# Patient Record
Sex: MALE | Race: WHITE | Employment: FULL TIME | URBAN - METROPOLITAN AREA
[De-identification: names, ages, dates, MRNs, and addresses within clinical notes are randomized per-mention and may not be internally consistent; named-entity substitution may affect disease eponyms.]

---

## 2017-08-12 ENCOUNTER — APPOINTMENT (OUTPATIENT)
Dept: GENERAL RADIOLOGY | Age: 37
End: 2017-08-12
Attending: EMERGENCY MEDICINE
Payer: COMMERCIAL

## 2017-08-12 ENCOUNTER — HOSPITAL ENCOUNTER (EMERGENCY)
Age: 37
Discharge: HOME | End: 2017-08-12
Attending: EMERGENCY MEDICINE
Payer: COMMERCIAL

## 2017-08-12 VITALS
HEIGHT: 69 IN | TEMPERATURE: 98.3 F | SYSTOLIC BLOOD PRESSURE: 137 MMHG | DIASTOLIC BLOOD PRESSURE: 62 MMHG | WEIGHT: 300 LBS | RESPIRATION RATE: 10 BRPM | OXYGEN SATURATION: 96 % | HEART RATE: 44 BPM | BODY MASS INDEX: 44.43 KG/M2

## 2017-08-12 DIAGNOSIS — R00.2 PALPITATIONS: Primary | ICD-10-CM

## 2017-08-12 DIAGNOSIS — R00.1 BRADYCARDIA: ICD-10-CM

## 2017-08-12 LAB
ANION GAP BLD CALC-SCNC: 4 MMOL/L (ref 3–18)
ATRIAL RATE: 58 BPM
BASOPHILS # BLD AUTO: 0 K/UL (ref 0–0.06)
BASOPHILS # BLD: 0 % (ref 0–2)
BUN SERPL-MCNC: 16 MG/DL (ref 7–18)
BUN/CREAT SERPL: 17 (ref 12–20)
CALCIUM SERPL-MCNC: 8.5 MG/DL (ref 8.5–10.1)
CALCULATED P AXIS, ECG09: 36 DEGREES
CALCULATED R AXIS, ECG10: 46 DEGREES
CALCULATED T AXIS, ECG11: 54 DEGREES
CHLORIDE SERPL-SCNC: 108 MMOL/L (ref 100–108)
CK MB CFR SERPL CALC: 1 % (ref 0–4)
CK MB SERPL-MCNC: 1.2 NG/ML (ref 5–25)
CK SERPL-CCNC: 124 U/L (ref 39–308)
CO2 SERPL-SCNC: 28 MMOL/L (ref 21–32)
CREAT SERPL-MCNC: 0.94 MG/DL (ref 0.6–1.3)
DIAGNOSIS, 93000: NORMAL
DIFFERENTIAL METHOD BLD: NORMAL
EOSINOPHIL # BLD: 0.4 K/UL (ref 0–0.4)
EOSINOPHIL NFR BLD: 5 % (ref 0–5)
ERYTHROCYTE [DISTWIDTH] IN BLOOD BY AUTOMATED COUNT: 13.8 % (ref 11.6–14.5)
GLUCOSE SERPL-MCNC: 99 MG/DL (ref 74–99)
HCT VFR BLD AUTO: 42.4 % (ref 36–48)
HGB BLD-MCNC: 14.7 G/DL (ref 13–16)
LYMPHOCYTES # BLD AUTO: 32 % (ref 21–52)
LYMPHOCYTES # BLD: 2.9 K/UL (ref 0.9–3.6)
MAGNESIUM SERPL-MCNC: 2.2 MG/DL (ref 1.6–2.6)
MCH RBC QN AUTO: 30.1 PG (ref 24–34)
MCHC RBC AUTO-ENTMCNC: 34.7 G/DL (ref 31–37)
MCV RBC AUTO: 86.7 FL (ref 74–97)
MONOCYTES # BLD: 0.7 K/UL (ref 0.05–1.2)
MONOCYTES NFR BLD AUTO: 8 % (ref 3–10)
NEUTS SEG # BLD: 4.9 K/UL (ref 1.8–8)
NEUTS SEG NFR BLD AUTO: 55 % (ref 40–73)
P-R INTERVAL, ECG05: 134 MS
PLATELET # BLD AUTO: 186 K/UL (ref 135–420)
PMV BLD AUTO: 10.9 FL (ref 9.2–11.8)
POTASSIUM SERPL-SCNC: 3.8 MMOL/L (ref 3.5–5.5)
Q-T INTERVAL, ECG07: 426 MS
QRS DURATION, ECG06: 104 MS
QTC CALCULATION (BEZET), ECG08: 418 MS
RBC # BLD AUTO: 4.89 M/UL (ref 4.7–5.5)
SODIUM SERPL-SCNC: 140 MMOL/L (ref 136–145)
TROPONIN I SERPL-MCNC: <0.02 NG/ML (ref 0–0.04)
VENTRICULAR RATE, ECG03: 58 BPM
WBC # BLD AUTO: 8.9 K/UL (ref 4.6–13.2)

## 2017-08-12 PROCEDURE — 71010 XR CHEST PORT: CPT

## 2017-08-12 PROCEDURE — 83735 ASSAY OF MAGNESIUM: CPT | Performed by: EMERGENCY MEDICINE

## 2017-08-12 PROCEDURE — 93005 ELECTROCARDIOGRAM TRACING: CPT

## 2017-08-12 PROCEDURE — 99284 EMERGENCY DEPT VISIT MOD MDM: CPT

## 2017-08-12 PROCEDURE — 82550 ASSAY OF CK (CPK): CPT | Performed by: EMERGENCY MEDICINE

## 2017-08-12 PROCEDURE — 85025 COMPLETE CBC W/AUTO DIFF WBC: CPT | Performed by: EMERGENCY MEDICINE

## 2017-08-12 PROCEDURE — 80048 BASIC METABOLIC PNL TOTAL CA: CPT | Performed by: EMERGENCY MEDICINE

## 2017-08-12 NOTE — LETTER
[facility] EMERGENCY DEPT 
[facility] [address] [id number] 
[phone] Work/School Note Date: 8/12/2017 To Whom It May concern: 
 
Darion López was seen and treated today in the emergency room by the following provider(s): 
Attending Provider: Russell Feliciano MD. Darion López may return to work on 08/13/2017. Sincerely, Amanda Gibbs RN

## 2017-08-12 NOTE — ED NOTES
Per tele monitor, Pt HR dropping to 42-45. At this time pt states he feels dizzy \"and then I panic and it goes back up again. \" BP stable at 135/98, pt is NOT diaphoretic and denies chest pain with these episodes. Dr. Ashli Whittaker made aware, tele strip printed and placed in chart for scanning.

## 2017-08-12 NOTE — ED NOTES
Pt bradycardia in the 40's with stable BP. States he still feels tired, but he has been working a lot. Denies chest pain, does not appear pale or diaphoretic. Dr. Stephens Half in to see the patient. Cardiac monitoring continued.

## 2017-08-12 NOTE — ED PROVIDER NOTES
[facility] EMERGENCY DEPT      42 y.o. male with noted past medical history of hypertension presents to the emergency department with complaints of an irregular heart beat. The patient states that he has had this problems for years, but today he felt dizzy and that his mouth was dry when he woke up. Patient an associated symptom of abdominal pain. Patient's symptoms are currently improved. Patient states that he does not have a primary care physician and has not had his symptoms checked out before. The patient states that he is a pack a day smoker. The patient denies alcohol and drug use. No other complaints. Nursing nurses regarding the HPI and triage nursing notes were reviewed. No current facility-administered medications for this encounter. Current Outpatient Prescriptions   Medication Sig    buprenorphine-naloxone 8-2 mg subl 2 Tabs by SubLINGual route daily. Indications: OPIOID WITHDRAWAL SYMPTOMS    propranolol LA (INDERAL LA) 60 mg SR capsule Take 60 mg by mouth daily. Indications: HYPERTENSION       Past Medical History:   Diagnosis Date    Hypertension        History reviewed. No pertinent surgical history. History reviewed. No pertinent family history. Social History     Social History    Marital status:      Spouse name: N/A    Number of children: N/A    Years of education: N/A     Occupational History    Not on file. Social History Main Topics    Smoking status: Current Every Day Smoker    Smokeless tobacco: Never Used    Alcohol use No    Drug use: No    Sexual activity: Not on file     Other Topics Concern    Not on file     Social History Narrative       No Known Allergies    Patient's primary care provider (as noted in EPIC):  None    REVIEW OF SYSTEMS:    Constitutional:  Negative for diaphoresis. Eyes:  Negative for diploplia. HENT:  Negative for congestion. Respiratory:  Negative for stridor.     Cardiovascular:  Negative for palpitations. Gastrointestinal:  Negative for diarrhea. Genitourinary:  Negative for flank pain. Musculoskeletal:  Negative for back pain. Skin:  Negative for pallor. Neurological:  Negative for weakness. Psychiatric:  Negative for hallucinations. Visit Vitals    /86    Pulse (!) 49    Temp 98.3 °F (36.8 °C)    Resp 10    Ht 5' 9\" (1.753 m)    Wt 136.1 kg (300 lb)    SpO2 95%    BMI 44.3 kg/m2       PHYSICAL EXAM:    CONSTITUTIONAL:  Alert, in no apparent distress;  well developed;  well nourished. HEAD:  Normocephalic, atraumatic. EYES:  EOMI. Non-icteric sclera. Normal conjunctiva. ENTM:  Nose:  no rhinorrhea. Throat:  no erythema or exudate, mucous membranes moist.  NECK:  No JVD. Supple  RESPIRATORY:  Chest clear, equal breath sounds, good air movement. CARDIOVASCULAR:  Regular rate and rhythm. No murmurs, rubs, or gallops. Chest:  No rash, lesions, bruising. No focal reproducible tenderness to palpation. GI:  Normal bowel sounds, abdomen soft and non-tender. No rebound or guarding. BACK:  Non-tender. UPPER EXT:  Normal inspection. LOWER EXT:  No edema, no calf tenderness. Distal pulses intact. NEURO:  Moves all four extremities, and grossly normal motor exam.  SKIN:  No rashes;  Normal for age. PSYCH:  Alert and normal affect. DIFFERENTIAL DIAGNOSES/ MEDICAL DECISION MAKING:  Palpitations from possible cardiac etiology, to include one of a multitude of underlying arrhythmias, presentations as part of an acute cardiac event including acute myocardial infarction/ acute coronary syndrome, mitral valve prolapse associated chest pain and symptoms, underlying sepsis, electrolyte imbalance, endocrine or thyroid axis disorder, dehydration, stress induced versus numerous other etiologies or a combination of the above.     Abnormal lab results from this emergency department encounter:  Labs Reviewed   CBC WITH AUTOMATED DIFF   METABOLIC PANEL, BASIC   MAGNESIUM CARDIAC PANEL,(CK, CKMB & TROPONIN)       Lab values for this patient within approximately the last 12 hours:  Recent Results (from the past 12 hour(s))   CBC WITH AUTOMATED DIFF    Collection Time: 08/12/17  3:45 AM   Result Value Ref Range    WBC 8.9 4.6 - 13.2 K/uL    RBC 4.89 4.70 - 5.50 M/uL    HGB 14.7 13.0 - 16.0 g/dL    HCT 42.4 36.0 - 48.0 %    MCV 86.7 74.0 - 97.0 FL    MCH 30.1 24.0 - 34.0 PG    MCHC 34.7 31.0 - 37.0 g/dL    RDW 13.8 11.6 - 14.5 %    PLATELET 861 362 - 768 K/uL    MPV 10.9 9.2 - 11.8 FL    NEUTROPHILS 55 40 - 73 %    LYMPHOCYTES 32 21 - 52 %    MONOCYTES 8 3 - 10 %    EOSINOPHILS 5 0 - 5 %    BASOPHILS 0 0 - 2 %    ABS. NEUTROPHILS 4.9 1.8 - 8.0 K/UL    ABS. LYMPHOCYTES 2.9 0.9 - 3.6 K/UL    ABS. MONOCYTES 0.7 0.05 - 1.2 K/UL    ABS. EOSINOPHILS 0.4 0.0 - 0.4 K/UL    ABS. BASOPHILS 0.0 0.0 - 0.06 K/UL    DF AUTOMATED     METABOLIC PANEL, BASIC    Collection Time: 08/12/17  3:45 AM   Result Value Ref Range    Sodium 140 136 - 145 mmol/L    Potassium 3.8 3.5 - 5.5 mmol/L    Chloride 108 100 - 108 mmol/L    CO2 28 21 - 32 mmol/L    Anion gap 4 3.0 - 18 mmol/L    Glucose 99 74 - 99 mg/dL    BUN 16 7.0 - 18 MG/DL    Creatinine 0.94 0.6 - 1.3 MG/DL    BUN/Creatinine ratio 17 12 - 20      GFR est AA >60 >60 ml/min/1.73m2    GFR est non-AA >60 >60 ml/min/1.73m2    Calcium 8.5 8.5 - 10.1 MG/DL   MAGNESIUM    Collection Time: 08/12/17  3:45 AM   Result Value Ref Range    Magnesium 2.2 1.6 - 2.6 mg/dL   CARDIAC PANEL,(CK, CKMB & TROPONIN)    Collection Time: 08/12/17  3:45 AM   Result Value Ref Range     39 - 308 U/L    CK - MB 1.2 <3.6 ng/ml    CK-MB Index 1.0 0.0 - 4.0 %    Troponin-I, Qt. <0.02 0.0 - 0.045 NG/ML       Radiologist and cardiologist interpretations if available at time of this note:  No results found. Portable (A-P view) CXR:  Preliminary review of x-rays by ED Physician.   Interpretation of chest X-ray shows, no infiltrates, no pneumothorax, no CHF, no effusion. Interpreted by ED Physician:  Cardiac Monitor Strip interpretation is Sinus Bradycardia about 60 bpm, No ST changes noted, NORMAL WIDTH QRS. 12 lead EKG interpreted by ED Physician is Sinus Bradycardia about 60 bpm, non-specific EKG. Medication(s) ordered for patient during this emergency visit encounter:  Medications - No data to display    ED COURSE:    4:30 AM  During ED course, patient has a few times noted by nursing staff of a transient bradycardia in the 40's. Of note, patient is on propanolol. I believe that this may be the cause of his bradycardia with associated palpitations (manifested as skipped beats) secondary to propanolol use. IMPRESSION AND MEDICAL DECISION MAKING:  Based upon the patient's presentation with noted HPI and PE, along with the work up done in the emergency department, I believe that the patient is having palpitations. DIAGNOSIS:  1. Palpitations    SPECIFIC PATIENT INSTRUCTIONS FROM THE PHYSICIAN WHO TREATED YOU IN THE ER TODAY:  1. Return if any concerns or worsening of condition(s)  2. Avoid caffeine products, including coffee, tea, chocolate or energy   drinks and tablets. 3. FOLLOW UP APPOINTMENT:  Your primary doctor in 1-2 days (Monday) for reevaluation and possible Holter monitor placement for outpatient evaluation of your palpitations. 4. Stop taking the propanolol for now until further instructions from your primary doctor. [facility] BROWN Romero M.D. Provider Attestation:  If a scribe was utilized in generation of this patient record, I personally performed the services described in the documentation, reviewed the documentation, as recorded by the scribe in my presence, and it accurately records the patient's history of presenting illness, review of systems, patient physical examination, and procedures performed by me as the attending physician. [facility] BROWN Romero M.D.   CYNDI Board Certified Emergency Physician  8/12/2017.  3:56 OLY Araya [address] [place] [address] acting as a scribe for and in the presence of Gael Red MD      August 12, 2017 at 5:06 AM       Provider Attestation:      I personally performed the services described in the documentation, reviewed the documentation, as recorded by the scribe in my presence, and it accurately and completely records my words and actions.  August 12, 2017 at 5:06 AM - Gael Red MD

## 2017-08-12 NOTE — DISCHARGE INSTRUCTIONS
SPECIFIC PATIENT INSTRUCTIONS FROM THE PHYSICIAN WHO TREATED YOU IN THE ER TODAY:  1. Return if any concerns or worsening of condition(s)  2. Avoid caffeine products, including coffee, tea, chocolate or energy   drinks and tablets. 3. FOLLOW UP APPOINTMENT:  Your primary doctor in 1-2 days (Monday) for reevaluation and possible Holter monitor placement for outpatient evaluation of your palpitations. 4. Stop taking the propanolol for now until further instructions from your primary doctor. Bradycardia: Care Instructions  Your Care Instructions    Bradycardia is a slow heart rate. If your heart beats too slowly, it can't supply your body with enough blood. This can make you weak or dizzy. Or it may make you pass out. Sometimes medicine can cause this problem. If this happens, your doctor may have you adjust one of your medicines. If a medicine is not the problem, your doctor may recommend a pacemaker. It is important to treat bradycardia so that you don't get more serious health problems. Your doctor will want to see you on a routine schedule to make sure that your heartbeat is normal.  Follow-up care is a key part of your treatment and safety. Be sure to make and go to all appointments, and call your doctor if you are having problems. It's also a good idea to know your test results and keep a list of the medicines you take. How can you care for yourself at home? · Take your medicines exactly as prescribed. Call your doctor if you think you are having a problem with your medicine. If your bradycardia is caused by another disease, your doctor will try to treat the disease. If it is caused by heart medicines, he or she will adjust your medicines. · Make lifestyle changes to improve your heart health. ¨ To control your cholesterol, avoid foods with a lot of fat, saturated fat, or sodium. Try to eat more fiber. And if your doctor says it's okay, get some exercise on most days. ¨ Do not smoke.  Smoking can make your heart condition worse. If you need help quitting, talk to your doctor about stop-smoking programs and medicines. These can increase your chances of quitting for good. ¨ Limit alcohol to 2 drinks a day for men and 1 drink a day for women. Too much alcohol can cause health problems. Pacemaker  If you have a pacemaker, you will get more specific information about it. Be sure to:  · Check your pulse as your doctor tells you. · Have your pacemaker checked as often as your doctor recommends. You may be able to do this over the phone or computer. · Avoid strong magnetic or electrical fields. These include wand metal detectors used in airports, MRIs, welding equipment, and generators. · You will be checked several times right after you get your pacemaker and when it is time to have the battery changed. Batteries last for 5 to 15 years. · You can talk on a cell phone. But keep it 6 inches away from your pacemaker. · Microwaves, TVs, radios, and kitchen and bathroom appliances won't harm you. When should you call for help? Call 911 anytime you think you may need emergency care. For example, call if:  · You passed out (lost consciousness). · You have symptoms of a heart attack. These may include:  ¨ Chest pain or pressure, or a strange feeling in the chest.  ¨ Sweating. ¨ Shortness of breath. ¨ Nausea or vomiting. ¨ Pain, pressure, or a strange feeling in the back, neck, jaw, or upper belly or in one or both shoulders or arms. ¨ Lightheadedness or sudden weakness. ¨ A fast or irregular heartbeat. After you call 911, the  may tell you to chew 1 adult-strength or 2 to 4 low-dose aspirin. Wait for an ambulance. Do not try to drive yourself. · You have symptoms of a stroke. These may include:  ¨ Sudden numbness, tingling, weakness, or loss of movement in your face, arm, or leg, especially on only one side of your body. ¨ Sudden vision changes. ¨ Sudden trouble speaking.   ¨ Sudden confusion or trouble understanding simple statements. ¨ Sudden problems with walking or balance. ¨ A sudden, severe headache that is different from past headaches. Call your doctor now or seek immediate medical care if:  · You are dizzy or lightheaded, or you feel like you may faint. · You have new or increased shortness of breath. · You had a pacemaker implanted and you have signs of infection, such as:  ¨ Increased pain, swelling, warmth, or redness. ¨ Red streaks leading from the cut (incision). ¨ Pus draining from the incision. ¨ A fever. Watch closely for changes in your health, and be sure to contact your doctor if you have any problems. Where can you learn more? Go to http://jaleel-tadeo.info/. Enter M068 in the search box to learn more about \"Bradycardia: Care Instructions. \"  Current as of: April 3, 2017  Content Version: 11.3  © 9818-1846 [employer]. Care instructions adapted under license by [employer] (which disclaims liability or warranty for this information). If you have questions about a medical condition or this instruction, always ask your healthcare professional. [place] [address] any warranty or liability for your use of this information. Palpitations: Care Instructions  Your Care Instructions    Heart palpitations are the uncomfortable sensation that your heart is beating fast or irregularly. You might feel pounding or fluttering in your chest. It might feel like your heart is skipping a beat. Although palpitations may be caused by a heart problem, they also occur because of stress, fatigue, or use of alcohol, caffeine, or nicotine. Many medicines, including diet pills, antihistamines, decongestants, and some herbal products, can cause heart palpitations. Nearly everyone has palpitations from time to time.   Depending on your symptoms, your doctor may need to do more tests to try to find the cause of your palpitations. Follow-up care is a key part of your treatment and safety. Be sure to make and go to all appointments, and call your doctor if you are having problems. It's also a good idea to know your test results and keep a list of the medicines you take. How can you care for yourself at home? · Avoid caffeine, nicotine, and excess alcohol. · Do not take illegal drugs, such as methamphetamines and cocaine. · Do not take weight loss or diet medicines unless you talk with your doctor first.  · Get plenty of sleep. · Do not overeat. · If you have palpitations again, take deep breaths and try to relax. This may slow a racing heart. · If you start to feel lightheaded, lie down to avoid injuries that might result if you pass out and fall down. · Keep a record of your palpitations and bring it to your next doctor's appointment. Write down:  ¨ The date and time. ¨ Your pulse. (If your heart is beating fast, it may be hard to count your pulse.)  ¨ What you were doing when the palpitations started. ¨ How long the palpitations lasted. ¨ Any other symptoms. · If an activity causes palpitations, slow down or stop. Talk to your doctor before you do that activity again. · Take your medicines exactly as prescribed. Call your doctor if you think you are having a problem with your medicine. When should you call for help? Call 911 anytime you think you may need emergency care. For example, call if:  · You passed out (lost consciousness). · You have symptoms of a heart attack. These may include:  ¨ Chest pain or pressure, or a strange feeling in the chest.  ¨ Sweating. ¨ Shortness of breath. ¨ Pain, pressure, or a strange feeling in the back, neck, jaw, or upper belly or in one or both shoulders or arms. ¨ Lightheadedness or sudden weakness. ¨ A fast or irregular heartbeat. After you call 911, the  may tell you to chew 1 adult-strength or 2 to 4 low-dose aspirin. Wait for an ambulance.  Do not try to drive yourself. · You have symptoms of a stroke. These may include:  ¨ Sudden numbness, tingling, weakness, or loss of movement in your face, arm, or leg, especially on only one side of your body. ¨ Sudden vision changes. ¨ Sudden trouble speaking. ¨ Sudden confusion or trouble understanding simple statements. ¨ Sudden problems with walking or balance. ¨ A sudden, severe headache that is different from past headaches. Call your doctor now or seek immediate medical care if:  · You have heart palpitations and:  ¨ Are dizzy or lightheaded, or you feel like you may faint. ¨ Have new or increased shortness of breath. Watch closely for changes in your health, and be sure to contact your doctor if:  · You continue to have heart palpitations. Where can you learn more? Go to http://jaleel-tadeo.info/. Enter R508 in the search box to learn more about \"Palpitations: Care Instructions. \"  Current as of: April 3, 2017  Content Version: 11.3  © 2776-3728 [employer]. Care instructions adapted under license by [employer] (which disclaims liability or warranty for this information). If you have questions about a medical condition or this instruction, always ask your healthcare professional. [address] any warranty or liability for your use of this information. [employer] Activation    Thank you for requesting access to [employer]. Please follow the instructions below to securely access and download your online medical record. [employer] allows you to send messages to your doctor, view your test results, renew your prescriptions, schedule appointments, and more. How Do I Sign Up? 1. In your internet browser, go to [email or web address]. 2. Click on the First Time User? Click Here link in the Sign In box. You will see the New Member Sign Up page. 3. Enter your [employer] Access Code exactly as it appears below.  You will not need to use this code after youve completed the sign-up process. If you do not sign up before the expiration date, you must request a new code. [employer] Access Code: [email or web address]  Expires: 11/10/2017  5:08 AM (This is the date your [employer] access code will )    4. Enter the last four digits of your Social Security Number (xxxx) and Date of Birth (mm/dd/yyyy) as indicated and click Submit. You will be taken to the next sign-up page. 5. Create a YouEarnedItt ID. This will be your [employer] login ID and cannot be changed, so think of one that is secure and easy to remember. 6. Create a [employer] password. You can change your password at any time. 7. Enter your Password Reset Question and Answer. This can be used at a later time if you forget your password. 8. Enter your e-mail address. You will receive e-mail notification when new information is available in [address] 9. Click Sign Up. You can now view and download portions of your medical record. 10. Click the Download Summary menu link to download a portable copy of your medical information. Additional Information    If you have questions, please visit the Frequently Asked Questions section of the [employer] website at [email or web address] Remember, [employer] is NOT to be used for urgent needs. For medical emergencies, dial 911.

## 2018-04-03 ENCOUNTER — HOSPITAL ENCOUNTER (EMERGENCY)
Age: 38
Discharge: HOME | End: 2018-04-04
Attending: EMERGENCY MEDICINE
Payer: COMMERCIAL

## 2018-04-03 DIAGNOSIS — R00.2 PALPITATIONS: Primary | ICD-10-CM

## 2018-04-03 PROCEDURE — 99285 EMERGENCY DEPT VISIT HI MDM: CPT

## 2018-04-03 PROCEDURE — 93005 ELECTROCARDIOGRAM TRACING: CPT

## 2018-04-04 VITALS
HEART RATE: 66 BPM | TEMPERATURE: 98.1 F | OXYGEN SATURATION: 95 % | DIASTOLIC BLOOD PRESSURE: 71 MMHG | RESPIRATION RATE: 12 BRPM | SYSTOLIC BLOOD PRESSURE: 131 MMHG

## 2018-04-04 LAB
ANION GAP BLD CALC-SCNC: 13 MMOL/L (ref 10–20)
BUN BLD-MCNC: 14 MG/DL (ref 7–18)
CA-I BLD-MCNC: 1.19 MMOL/L (ref 1.12–1.32)
CHLORIDE BLD-SCNC: 102 MMOL/L (ref 100–108)
CO2 BLD-SCNC: 30 MMOL/L (ref 19–24)
CREAT UR-MCNC: 0.9 MG/DL (ref 0.6–1.3)
GLUCOSE BLD STRIP.AUTO-MCNC: 99 MG/DL (ref 74–106)
HCT VFR BLD CALC: 46 % (ref 36–49)
HGB BLD-MCNC: 15.6 G/DL (ref 12–16)
POTASSIUM BLD-SCNC: 4 MMOL/L (ref 3.5–5.5)
SODIUM BLD-SCNC: 141 MMOL/L (ref 136–145)
TROPONIN I BLD-MCNC: <0.04 NG/ML (ref 0–0.08)

## 2018-04-04 PROCEDURE — 80047 BASIC METABLC PNL IONIZED CA: CPT

## 2018-04-04 PROCEDURE — 84484 ASSAY OF TROPONIN QUANT: CPT

## 2018-04-04 NOTE — ED PROVIDER NOTES
HPI Comments: Darion López is a 34 y.o. Male with h/o htn with c/o sensation of heart skipping beats earlier this evening without cp, sob, syncope, near syncope. Sx now  Resolved. Is on propanolol for htn which he has been taking. No sob, fcs, nvd. Increased stress recently, dec sleep. Unknown trigger and nothing taken. The history is provided by the patient. Past Medical History:   Diagnosis Date    Hypertension        No past surgical history on file. No family history on file. Social History     Social History    Marital status:      Spouse name: N/A    Number of children: N/A    Years of education: N/A     Occupational History    Not on file. Social History Main Topics    Smoking status: Current Every Day Smoker    Smokeless tobacco: Never Used    Alcohol use No    Drug use: No    Sexual activity: Not on file     Other Topics Concern    Not on file     Social History Narrative         ALLERGIES: Review of patient's allergies indicates no known allergies. Review of Systems   Constitutional: Negative for fever. HENT: Negative for sore throat. Eyes: Negative for visual disturbance. Respiratory: Negative for shortness of breath. Cardiovascular: Positive for palpitations. Negative for chest pain. Gastrointestinal: Negative for abdominal pain. Endocrine: Negative for polyuria. Musculoskeletal: Negative for gait problem. Skin: Negative for rash. Allergic/Immunologic: Negative for immunocompromised state. Neurological: Negative for syncope. Psychiatric/Behavioral: Positive for sleep disturbance. The patient is nervous/anxious. Vitals:    04/03/18 2321 04/04/18 0108 04/04/18 0134   BP: (!) 161/105  148/90   Pulse: 73  64   Resp: 18  12   Temp: 98.1 °F (36.7 °C)     SpO2: 99% 98% 97%            Physical Exam   Constitutional: He is oriented to person, place, and time. Non-toxic appearance. He does not appear ill. No distress.    HENT:   Head: Normocephalic and atraumatic. Right Ear: External ear normal.   Left Ear: External ear normal.   Nose: Nose normal.   Mouth/Throat: Oropharynx is clear and moist. No oropharyngeal exudate. Eyes: Conjunctivae are normal.   Neck: Normal range of motion. Cardiovascular: Normal rate, regular rhythm, normal heart sounds and intact distal pulses. Pulmonary/Chest: Effort normal and breath sounds normal. No respiratory distress. Abdominal: Soft. There is no tenderness. Musculoskeletal: Normal range of motion. He exhibits no edema. Neurological: He is alert and oriented to person, place, and time. Skin: Skin is warm and dry. He is not diaphoretic. Psychiatric: His behavior is normal.   Nursing note and vitals reviewed.        [facility]      ED Course       Procedures  Vitals:  Patient Vitals for the past 12 hrs:   Temp Pulse Resp BP SpO2   04/04/18 0134 - 64 12 148/90 97 %   04/04/18 0108 - - - - 98 %   04/03/18 2321 98.1 °F (36.7 °C) 73 18 (!) 161/105 99 %         Medications ordered:   Medications - No data to display      Lab findings:  Recent Results (from the past 12 hour(s))   EKG, 12 LEAD, INITIAL    Collection Time: 04/03/18 11:27 PM   Result Value Ref Range    Ventricular Rate 69 BPM    Atrial Rate 69 BPM    P-R Interval 152 ms    QRS Duration 100 ms    Q-T Interval 404 ms    QTC Calculation (Bezet) 432 ms    Calculated P Axis 30 degrees    Calculated R Axis 34 degrees    Calculated T Axis 38 degrees    Diagnosis       Normal sinus rhythm  Normal ECG  When compared with ECG of 12-AUG-2017 03:34,  No significant change was found     POC TROPONIN-I    Collection Time: 04/04/18  1:41 AM   Result Value Ref Range    Troponin-I (POC) <0.04 0.00 - 0.08 ng/mL   POC CHEM8    Collection Time: 04/04/18  1:42 AM   Result Value Ref Range    CO2, POC 30 (H) 19 - 24 MMOL/L    Glucose, POC 99 74 - 106 MG/DL    BUN, POC 14 7 - 18 MG/DL    Creatinine, POC 0.9 0.6 - 1.3 MG/DL    GFRAA, POC >60 >60 ml/min/1.73m2    GFRNA, POC >60 >60 ml/min/1.73m2    Sodium,  136 - 145 MMOL/L    Potassium, POC 4.0 3.5 - 5.5 MMOL/L    Calcium, ionized (POC) 1.19 1.12 - 1.32 mmol/L    Chloride,  100 - 108 MMOL/L    Anion gap, POC 13 10 - 20      Hematocrit, POC 46 36 - 49 %    Hemoglobin, POC 15.6 12 - 16 G/DL       EKG interpretation by ED Physician:  nsr with no acute st tw changes  Rate 69, pr 152, atc 432  No sig changes    Cardiac monitor: nl rat reg rhythm. No ectopy  Pulse ox\" 97% room air    X-Ray, CT or other radiology findings or impressions:  No orders to display       Progress notes, Consult notes or additional Procedure notes:   Doubt need for other work up. Pt already on bb. Likely due to stress, dec sleep  I have discussed with patient and/or family/sig other the results, interpretation of any imaging if performed, suspected diagnosis and treatment plan to include instructions regarding the diagnoses listed to which understanding was expressed with all questions answered      Reevaluation of patient:   stable    Disposition:  Diagnosis:   1. Palpitations        Disposition: home    Follow-up Information     Follow up With Details Comments Contact Info    Rowena Montero MD Schedule an appointment as soon as possible for a visit If symptoms worsen [facility] ([facility])  [facility]  [place] [address]  [phone]              Patient's Medications   Start Taking    No medications on file   Continue Taking    BUPRENORPHINE-NALOXONE 8-2 MG SUBL    2 Tabs by SubLINGual route daily. Indications: OPIOID WITHDRAWAL SYMPTOMS    PROPRANOLOL LA (INDERAL LA) 60 MG SR CAPSULE    Take 60 mg by mouth daily.  Indications: HYPERTENSION   These Medications have changed    No medications on file   Stop Taking    No medications on file

## 2018-04-05 LAB
ATRIAL RATE: 69 BPM
CALCULATED P AXIS, ECG09: 30 DEGREES
CALCULATED R AXIS, ECG10: 34 DEGREES
CALCULATED T AXIS, ECG11: 38 DEGREES
DIAGNOSIS, 93000: NORMAL
P-R INTERVAL, ECG05: 152 MS
Q-T INTERVAL, ECG07: 404 MS
QRS DURATION, ECG06: 100 MS
QTC CALCULATION (BEZET), ECG08: 432 MS
VENTRICULAR RATE, ECG03: 69 BPM

## 2018-07-02 ENCOUNTER — APPOINTMENT (OUTPATIENT)
Dept: GENERAL RADIOLOGY | Age: 38
DRG: 309 | End: 2018-07-02
Attending: NURSE PRACTITIONER
Payer: SELF-PAY

## 2018-07-02 ENCOUNTER — HOSPITAL ENCOUNTER (INPATIENT)
Age: 38
LOS: 3 days | Discharge: HOME | DRG: 309 | End: 2018-07-05
Attending: EMERGENCY MEDICINE | Admitting: FAMILY MEDICINE
Payer: SELF-PAY

## 2018-07-02 DIAGNOSIS — R00.1 BRADYCARDIA: Primary | ICD-10-CM

## 2018-07-02 LAB
ALBUMIN SERPL-MCNC: 3.5 G/DL (ref 3.4–5)
ALBUMIN/GLOB SERPL: 1 {RATIO} (ref 0.8–1.7)
ALP SERPL-CCNC: 115 U/L (ref 45–117)
ALT SERPL-CCNC: 22 U/L (ref 16–61)
ANION GAP SERPL CALC-SCNC: 7 MMOL/L (ref 3–18)
AST SERPL-CCNC: 13 U/L (ref 15–37)
ATRIAL RATE: 43 BPM
ATRIAL RATE: 62 BPM
BASOPHILS # BLD: 0 K/UL (ref 0–0.06)
BASOPHILS NFR BLD: 1 % (ref 0–2)
BILIRUB SERPL-MCNC: 0.5 MG/DL (ref 0.2–1)
BUN SERPL-MCNC: 14 MG/DL (ref 7–18)
BUN/CREAT SERPL: 15 (ref 12–20)
CALCIUM SERPL-MCNC: 8.8 MG/DL (ref 8.5–10.1)
CALCULATED P AXIS, ECG09: 31 DEGREES
CALCULATED P AXIS, ECG09: 33 DEGREES
CALCULATED R AXIS, ECG10: 14 DEGREES
CALCULATED R AXIS, ECG10: 39 DEGREES
CALCULATED T AXIS, ECG11: 38 DEGREES
CALCULATED T AXIS, ECG11: 38 DEGREES
CHLORIDE SERPL-SCNC: 105 MMOL/L (ref 100–108)
CK MB CFR SERPL CALC: NORMAL % (ref 0–4)
CK MB SERPL-MCNC: <1 NG/ML (ref 5–25)
CK SERPL-CCNC: 96 U/L (ref 39–308)
CO2 SERPL-SCNC: 28 MMOL/L (ref 21–32)
CREAT SERPL-MCNC: 0.95 MG/DL (ref 0.6–1.3)
DIAGNOSIS, 93000: NORMAL
DIAGNOSIS, 93000: NORMAL
DIFFERENTIAL METHOD BLD: NORMAL
EOSINOPHIL # BLD: 0.2 K/UL (ref 0–0.4)
EOSINOPHIL NFR BLD: 2 % (ref 0–5)
ERYTHROCYTE [DISTWIDTH] IN BLOOD BY AUTOMATED COUNT: 13.3 % (ref 11.6–14.5)
GLOBULIN SER CALC-MCNC: 3.4 G/DL (ref 2–4)
GLUCOSE SERPL-MCNC: 123 MG/DL (ref 74–99)
HCT VFR BLD AUTO: 44.8 % (ref 36–48)
HGB BLD-MCNC: 15.4 G/DL (ref 13–16)
LYMPHOCYTES # BLD: 2 K/UL (ref 0.9–3.6)
LYMPHOCYTES NFR BLD: 23 % (ref 21–52)
MAGNESIUM SERPL-MCNC: 2.1 MG/DL (ref 1.6–2.6)
MCH RBC QN AUTO: 30.1 PG (ref 24–34)
MCHC RBC AUTO-ENTMCNC: 34.4 G/DL (ref 31–37)
MCV RBC AUTO: 87.5 FL (ref 74–97)
MONOCYTES # BLD: 0.6 K/UL (ref 0.05–1.2)
MONOCYTES NFR BLD: 7 % (ref 3–10)
NEUTS SEG # BLD: 5.8 K/UL (ref 1.8–8)
NEUTS SEG NFR BLD: 67 % (ref 40–73)
P-R INTERVAL, ECG05: 136 MS
P-R INTERVAL, ECG05: 148 MS
PLATELET # BLD AUTO: 195 K/UL (ref 135–420)
PMV BLD AUTO: 11.4 FL (ref 9.2–11.8)
POTASSIUM SERPL-SCNC: 4 MMOL/L (ref 3.5–5.5)
PROT SERPL-MCNC: 6.9 G/DL (ref 6.4–8.2)
Q-T INTERVAL, ECG07: 408 MS
Q-T INTERVAL, ECG07: 446 MS
QRS DURATION, ECG06: 100 MS
QRS DURATION, ECG06: 108 MS
QTC CALCULATION (BEZET), ECG08: 376 MS
QTC CALCULATION (BEZET), ECG08: 414 MS
RBC # BLD AUTO: 5.12 M/UL (ref 4.7–5.5)
SODIUM SERPL-SCNC: 140 MMOL/L (ref 136–145)
TROPONIN I SERPL-MCNC: <0.02 NG/ML (ref 0–0.04)
TROPONIN I SERPL-MCNC: <0.02 NG/ML (ref 0–0.04)
TSH SERPL DL<=0.05 MIU/L-ACNC: 0.92 UIU/ML (ref 0.36–3.74)
VENTRICULAR RATE, ECG03: 43 BPM
VENTRICULAR RATE, ECG03: 62 BPM
WBC # BLD AUTO: 8.6 K/UL (ref 4.6–13.2)

## 2018-07-02 PROCEDURE — 84443 ASSAY THYROID STIM HORMONE: CPT | Performed by: INTERNAL MEDICINE

## 2018-07-02 PROCEDURE — 93005 ELECTROCARDIOGRAM TRACING: CPT

## 2018-07-02 PROCEDURE — 74011250636 HC RX REV CODE- 250/636: Performed by: NURSE PRACTITIONER

## 2018-07-02 PROCEDURE — 80053 COMPREHEN METABOLIC PANEL: CPT | Performed by: EMERGENCY MEDICINE

## 2018-07-02 PROCEDURE — 77030021352 HC CBL LD SYS DISP COVD -B

## 2018-07-02 PROCEDURE — 85025 COMPLETE CBC W/AUTO DIFF WBC: CPT | Performed by: EMERGENCY MEDICINE

## 2018-07-02 PROCEDURE — 65660000000 HC RM CCU STEPDOWN

## 2018-07-02 PROCEDURE — 83735 ASSAY OF MAGNESIUM: CPT | Performed by: EMERGENCY MEDICINE

## 2018-07-02 PROCEDURE — 77030032491 HC SLV COMPR SCD KNE XL COVD -B

## 2018-07-02 PROCEDURE — 99284 EMERGENCY DEPT VISIT MOD MDM: CPT

## 2018-07-02 PROCEDURE — 82550 ASSAY OF CK (CPK): CPT | Performed by: EMERGENCY MEDICINE

## 2018-07-02 PROCEDURE — 71045 X-RAY EXAM CHEST 1 VIEW: CPT

## 2018-07-02 PROCEDURE — 36415 COLL VENOUS BLD VENIPUNCTURE: CPT | Performed by: NURSE PRACTITIONER

## 2018-07-02 PROCEDURE — 74011250637 HC RX REV CODE- 250/637: Performed by: EMERGENCY MEDICINE

## 2018-07-02 PROCEDURE — 96360 HYDRATION IV INFUSION INIT: CPT

## 2018-07-02 PROCEDURE — 74011250636 HC RX REV CODE- 250/636: Performed by: EMERGENCY MEDICINE

## 2018-07-02 RX ORDER — ACETAMINOPHEN 325 MG/1
650 TABLET ORAL
Status: DISCONTINUED | OUTPATIENT
Start: 2018-07-02 | End: 2018-07-05 | Stop reason: HOSPADM

## 2018-07-02 RX ORDER — HEPARIN SODIUM 5000 [USP'U]/ML
5000 INJECTION, SOLUTION INTRAVENOUS; SUBCUTANEOUS EVERY 8 HOURS
Status: DISCONTINUED | OUTPATIENT
Start: 2018-07-02 | End: 2018-07-05 | Stop reason: HOSPADM

## 2018-07-02 RX ORDER — HYDROCHLOROTHIAZIDE 25 MG/1
25 TABLET ORAL DAILY
Status: DISCONTINUED | OUTPATIENT
Start: 2018-07-03 | End: 2018-07-05 | Stop reason: HOSPADM

## 2018-07-02 RX ORDER — SODIUM CHLORIDE 9 MG/ML
50 INJECTION, SOLUTION INTRAVENOUS CONTINUOUS
Status: DISCONTINUED | OUTPATIENT
Start: 2018-07-02 | End: 2018-07-05 | Stop reason: HOSPADM

## 2018-07-02 RX ORDER — GUAIFENESIN 100 MG/5ML
324 LIQUID (ML) ORAL
Status: COMPLETED | OUTPATIENT
Start: 2018-07-02 | End: 2018-07-02

## 2018-07-02 RX ADMIN — ASPIRIN 81 MG 324 MG: 81 TABLET ORAL at 12:50

## 2018-07-02 RX ADMIN — HEPARIN SODIUM 5000 UNITS: 5000 INJECTION, SOLUTION INTRAVENOUS; SUBCUTANEOUS at 16:16

## 2018-07-02 RX ADMIN — SODIUM CHLORIDE 50 ML/HR: 900 INJECTION, SOLUTION INTRAVENOUS at 16:06

## 2018-07-02 RX ADMIN — SODIUM CHLORIDE 1000 ML: 900 INJECTION, SOLUTION INTRAVENOUS at 12:33

## 2018-07-02 NOTE — ROUTINE PROCESS
1545 - patient to [phone] from ER via stretcher - daughter at bedside and wife is on her way -  Patient is alert and oriented, denies any pain, does c/o lightheadedness with standing and when closing his eyes - denies any CP or SOB. IV fluids started.   Placed on tele and confirmed SB - admission assessment completed     1630 - wife at bedside and updated

## 2018-07-02 NOTE — CONSULTS
[facility] - Consult Note    Date of  Admission: 7/2/2018 11:57 AM   Primary Care Physician:  None      I saw, evaluated, interviewed and examined the patient personally. I agree with the findings and plan of care as documented below with ROSHNI note  Came to ED with some lightneadedness  Bradycardia noted on tele monitor. Possible occasional junctional rhythm but on EKG it is sinus bradycardia. Denies any presyncope or syncope  On High dose propranolol for HTN and anxiety  Electrolytes within normal limits  TSH added. - Discontinue beta blocker ( propranolol ) and avoid any AV elian blocking agent  - Use Chlorthalidone or HCTZ 25 mg daily for HTN  - ECHO can be done however can be done as outpatient if plan is to discharge. If echo unremarkable, no further cardiac work up will be needed. Mayank Sanchez MD           Assessment:     - Symptomatic david and possible junctional rhythm. Previously was taking beta blocker, Propranolol 160 mg daily.  - Anxiety   - Obesity  - Hypertension     Plan:     - Stop Propranolol, would find alternative antihypertensive and do not resume AV elian blocking agents. - If admitted, check echocardiogram  - TSH has been added onto labwork     History of Present Illness: This is a 34 y.o. male admitted for Bradycardia. Patient complains of:  Lightheaded    This is a 34year old male with a history of hypertension and anxiety that cardiology is seeing in consult due to bradycardia. He explains since Thursday, he started feeling like \"oxygen was leaving my head. \" He also complaints of intermittent palpitations, saying that he sometimes feels his heart is irregular, sometimes it skips and races. He has not had any chest pain or syncope. Has not had orthopnea or PND. He takes Propranolol 160 mg daily, his last dose was last night. He also has been out of one of his anxiety medications. Denies history of CAD or CHF.       Cardiac risk factors: smoking/ tobacco exposure, obesity, male gender, hypertension      Review of Symptoms:  Except as stated above include:  Constitutional:  negative  Respiratory:  negative  Cardiovascular:  Per HPI  Gastrointestinal: negative  Genitourinary:  negative  Musculoskeletal:  Negative  Neurological:  Negative  Dermatological:  Negative  Endocrinological: Negative  Psychological:  Negative    A comprehensive review of systems was negative except for that written in the HPI. Past Medical History:     Past Medical History:   Diagnosis Date    Hypertension          Social History:     Social History     Social History    Marital status:      Spouse name: N/A    Number of children: N/A    Years of education: N/A     Social History Main Topics    Smoking status: Current Every Day Smoker    Smokeless tobacco: Never Used    Alcohol use No    Drug use: No    Sexual activity: Not on file     Other Topics Concern    Not on file     Social History Narrative        Family History:   No family history on file. Medications:   No Known Allergies     No current facility-administered medications for this encounter. Current Outpatient Prescriptions   Medication Sig    buprenorphine-naloxone 8-2 mg subl 2 Tabs by SubLINGual route daily. Indications: OPIOID WITHDRAWAL SYMPTOMS    propranolol LA (INDERAL LA) 60 mg SR capsule Take 60 mg by mouth daily.  Indications: HYPERTENSION         Physical Exam:     Visit Vitals    /88 (BP 1 Location: Right arm, BP Patient Position: At rest)    Pulse (!) 51    Temp 98.3 °F (36.8 °C)    Resp 16    Ht 5' 9\" (1.753 m)    Wt 300 lb (136.1 kg)    SpO2 99%    BMI 44.3 kg/m2     BP Readings from Last 3 Encounters:   07/02/18 179/88   04/04/18 131/71   08/12/17 137/62     Pulse Readings from Last 3 Encounters:   07/02/18 (!) 51   04/04/18 66   08/12/17 (!) 44     Wt Readings from Last 3 Encounters:   07/02/18 300 lb (136.1 kg)   08/12/17 300 lb (136.1 kg)   10/12/16 250 lb (113.4 kg) General:  alert, cooperative, no distress  Neck:  nontender, no JVD  Lungs:  clear to auscultation bilaterally  Heart:  regular rate and rhythm, S1, S2 normal, no murmur, click, rub or gallop  Abdomen:  abdomen is soft without significant tenderness, masses, organomegaly or guarding  Extremities:  extremities normal, atraumatic, no cyanosis or edema  Skin: Warm and dry. no hyperpigmentation, vitiligo, or suspicious lesions  Neuro: alert, oriented x3, affect appropriate  Psych: non focal     Data Review:     Recent Labs      07/02/18   1222   WBC  8.6   HGB  15.4   HCT  44.8   PLT  195     Recent Labs      07/02/18   1222   NA  140   K  4.0   CL  105   CO2  28   GLU  123*   BUN  14   CREA  0.95   CA  8.8   MG  2.1   ALB  3.5   SGOT  13*   ALT  22       Results for orders placed or performed during the hospital encounter of 04/03/18   EKG, 12 LEAD, INITIAL   Result Value Ref Range    Ventricular Rate 69 BPM    Atrial Rate 69 BPM    P-R Interval 152 ms    QRS Duration 100 ms    Q-T Interval 404 ms    QTC Calculation (Bezet) 432 ms    Calculated P Axis 30 degrees    Calculated R Axis 34 degrees    Calculated T Axis 38 degrees    Diagnosis       Normal sinus rhythm  Normal ECG  When compared with ECG of 12-AUG-2017 03:34,  No significant change was found  Confirmed by Jailyn Marcano ([id number]) on 4/5/2018 11:31:37 AM         All Cardiac Markers in the last 24 hours:    Lab Results   Component Value Date/Time    CPK 96 07/02/2018 12:22 PM    CKMB <1.0 07/02/2018 12:22 PM    CKND1  07/02/2018 12:22 PM     CALCULATION NOT PERFORMED WHEN RESULT IS BELOW LINEAR LIMIT    Jose Founds <0.02 07/02/2018 12:22 PM       Last Lipid:  No results found for: CHOL, CHOLX, CHLST, CHOLV, HDL, LDL, LDLC, DLDLP, TGLX, TRIGL, TRIGP, CHHD, [facility]    Signed By: Natalia Gao.  Tamar Ramirez     July 2, 2018

## 2018-07-02 NOTE — ED NOTES
TRANSFER - ED to INPATIENT REPORT:    SBAR report made available to receiving floor on this patient being transferred to [address] [place] [address] (TELE)  for routine progression of care       Admitting diagnosis Bradycardia    Information from the following report(s) SBAR, ED Summary, Recent Results and Cardiac Rhythm sinus david was made available to receiving floor. Lines:   Peripheral IV 07/02/18 Left Antecubital (Active)   Site Assessment Clean, dry, & intact 7/2/2018 12:30 PM   Phlebitis Assessment 0 7/2/2018 12:30 PM   Infiltration Assessment 0 7/2/2018 12:30 PM   Action Taken Blood drawn 7/2/2018 12:30 PM        Medication list confirmed with patient    Opportunity for questions and clarification was provided.       Patient is oriented to time, place, person and situation n/a  Patient is  continent and ambulatory without assist     Valuables transported with patient     Patient transported with:   Monitor  Tech

## 2018-07-02 NOTE — IP AVS SNAPSHOT
[address] [address] 
[phone] Patient: Esequiel Tomlin MRN: [id number] A check arlyn indicates which time of day the medication should be taken. My Medications START taking these medications Instructions Each Dose to Equal  
 Morning Noon Evening Bedtime  
 hydroCHLOROthiazide 25 mg tablet Commonly known as:  HYDRODIURIL Start taking on:  7/6/2018 Your last dose was: Your next dose is: Take 1 Tab by mouth daily for 30 days. 25 mg CONTINUE taking these medications Instructions Each Dose to Equal  
 Morning Noon Evening Bedtime  
 buprenorphine-naloxone 8-2 mg Subl Your last dose was: Your next dose is:    
   
   
 2 Tabs by SubLINGual route daily. Indications: OPIOID WITHDRAWAL SYMPTOMS  
 2 Tab  
    
   
   
   
  
  
STOP taking these medications   
 doxepin 10 mg capsule Commonly known as:  SINEquan INDERAL LA 60 mg SR capsule Generic drug:  propranolol LA Where to Get Your Medications Information on where to get these meds will be given to you by the nurse or doctor. ! Ask your nurse or doctor about these medications  
  hydroCHLOROthiazide 25 mg tablet

## 2018-07-02 NOTE — IP AVS SNAPSHOT
[address] [address] 
[phone] Patient: Ovi Chavez MRN: [id number] GDD:[id number] About your hospitalization You were admitted on:  July 2, 2018 You last received care in the:  [address] [place] [address] Road You were discharged on:  July 5, 2018 Why you were hospitalized Your primary diagnosis was:  Not on File Your diagnoses also included:  Bradycardia, Htn (Hypertension), Anxiety Follow-up Information Follow up With Details Comments Contact Info Cassandra Flores MD On 7/6/2018 7:30am [address] [address] 
[phone] Your Scheduled Appointments Friday July 06, 2018  7:30 AM EDT New Patient with Cassandra Flores MD  
[facility]) [address] [address]  
[phone] Discharge Orders None A check arlyn indicates which time of day the medication should be taken. My Medications START taking these medications Instructions Each Dose to Equal  
 Morning Noon Evening Bedtime  
 hydroCHLOROthiazide 25 mg tablet Commonly known as:  HYDRODIURIL Start taking on:  7/6/2018 Your last dose was: Your next dose is: Take 1 Tab by mouth daily for 30 days. 25 mg CONTINUE taking these medications Instructions Each Dose to Equal  
 Morning Noon Evening Bedtime  
 buprenorphine-naloxone 8-2 mg Subl Your last dose was: Your next dose is:    
   
   
 2 Tabs by SubLINGual route daily. Indications: OPIOID WITHDRAWAL SYMPTOMS  
 2 Tab  
    
   
   
   
  
  
STOP taking these medications   
 doxepin 10 mg capsule Commonly known as:  SINEquan INDERAL LA 60 mg SR capsule Generic drug:  propranolol LA Where to Get Your Medications Information on where to get these meds will be given to you by the nurse or doctor. ! Ask your nurse or doctor about these medications  
  hydroCHLOROthiazide 25 mg tablet Opioid Education Prescription Opioids: What You Need to Know: 
 
Prescription opioids can be used to help relieve moderate-to-severe pain and are often prescribed following a surgery or injury, or for certain health conditions. These medications can be an important part of treatment but also come with serious risks. Opioids are strong pain medicines. Examples include hydrocodone, oxycodone, fentanyl, and morphine. Heroin is an example of an illegal opioid. It is important to work with your health care provider to make sure you are getting the safest, most effective care. WHAT ARE THE RISKS AND SIDE EFFECTS OF OPIOID USE? Prescription opioids carry serious risks of addiction and overdose, especially with prolonged use. An opioid overdose, often marked by slow breathing, can cause sudden death. The use of prescription opioids can have a number of side effects as well, even when taken as directed. · Tolerance-meaning you might need to take more of a medication for the same pain relief · Physical dependence-meaning you have symptoms of withdrawal when the medication is stopped. Withdrawal symptoms can include nausea, sweating, chills, diarrhea, stomach cramps, and muscle aches. Withdrawal can last up to several weeks, depending on which drug you took and how long you took it. · Increased sensitivity to pain · Constipation · Nausea, vomiting, and dry mouth · Sleepiness and dizziness · Confusion · Depression · Low levels of testosterone that can result in lower sex drive, energy, and strength · Itching and sweating RISKS ARE GREATER WITH:      
· History of drug misuse, substance use disorder, or overdose · Mental health conditions (such as depression or anxiety) · Sleep apnea · Older age (72 years or older) · Pregnancy Avoid alcohol while taking prescription opioids. Also, unless specifically advised by your health care provider, medications to avoid include: · Benzodiazepines (such as Xanax or Valium) · Muscle relaxants (such as Soma or Flexeril) · Hypnotics (such as Ambien or Lunesta) · Other prescription opioids KNOW YOUR OPTIONS Talk to your health care provider about ways to manage your pain that don't involve prescription opioids. Some of these options may actually work better and have fewer risks and side effects. Options may include: 
· Pain relievers such as acetaminophen, ibuprofen, and naproxen · Some medications that are also used for depression or seizures · Physical therapy and exercise · Counseling to help patients learn how to cope better with triggers of pain and stress. · Application of heat or cold compress · Massage therapy · Relaxation techniques Be Informed Make sure you know the name of your medication, how much and how often to take it, and its potential risks & side effects. IF YOU ARE PRESCRIBED OPIOIDS FOR PAIN: 
· Never take opioids in greater amounts or more often than prescribed. Remember the goal is not to be pain-free but to manage your pain at a tolerable level. · Follow up with your primary care provider to: · Work together to create a plan on how to manage your pain. · Talk about ways to help manage your pain that don't involve prescription opioids. · Talk about any and all concerns and side effects. · Help prevent misuse and abuse. · Never sell or share prescription opioids · Help prevent misuse and abuse. · Store prescription opioids in a secure place and out of reach of others (this may include visitors, children, friends, and family).  
· Safely dispose of unused/unwanted prescription opioids: Find your community drug take-back program or your pharmacy mail-back program, or flush them down the toilet, following guidance from the Food and Drug Administration (www.fda.gov/Drugs/ResourcesForYou). · Visit www.cdc.gov/drugoverdose to learn about the risks of opioid abuse and overdose. · If you believe you may be struggling with addiction, tell your health care provider and ask for guidance or call Anay Recio at 9-695-345-FULU. Discharge Instructions Bradycardia: Care Instructions Your Care Instructions Bradycardia is a slow heart rate. If your heart beats too slowly, it can't supply your body with enough blood. This can make you weak or dizzy. Or it may make you pass out. Sometimes medicine can cause this problem. If this happens, your doctor may have you adjust one of your medicines. If a medicine is not the problem, your doctor may recommend a pacemaker. It is important to treat bradycardia so that you don't get more serious health problems. Your doctor will want to see you on a routine schedule to make sure that your heartbeat is normal. 
Follow-up care is a key part of your treatment and safety. Be sure to make and go to all appointments, and call your doctor if you are having problems. It's also a good idea to know your test results and keep a list of the medicines you take. How can you care for yourself at home? · Take your medicines exactly as prescribed. Call your doctor if you think you are having a problem with your medicine. If your bradycardia is caused by another disease, your doctor will try to treat the disease. If it is caused by heart medicines, he or she will adjust your medicines. · Make lifestyle changes to improve your heart health. ¨ Get regular exercise. Try for 30 minutes on most days of the week. If you do not have other heart problems, you likely do not have limits on the type or level of activity that you can do.  You may want to walk, swim, bike, or do other activities. Ask your doctor what level of exercise is safe for you. ¨ To control your cholesterol, avoid foods with a lot of fat, saturated fat, or sodium. Try to eat more fiber. And if your doctor says it's okay, get some exercise on most days. ¨ Do not smoke. Smoking can make your heart condition worse. If you need help quitting, talk to your doctor about stop-smoking programs and medicines. These can increase your chances of quitting for good. ¨ Limit alcohol to 2 drinks a day for men and 1 drink a day for women. Too much alcohol can cause health problems. Pacemaker If you have a pacemaker, you will get more specific information about it. Be sure to: · Check your pulse as your doctor tells you. · Have your pacemaker checked as often as your doctor recommends. You may be able to do this over the phone or computer. · Avoid strong magnetic or electrical fields. These include wand metal detectors used in airports, MRIs, welding equipment, and generators. · You will be checked several times right after you get your pacemaker and when it is time to have the battery changed. Batteries last for 5 to 15 years. · You can talk on a cell phone. But keep it 6 inches away from your pacemaker. · Microwaves, TVs, radios, and kitchen and bathroom appliances won't harm you. When should you call for help? Call 911 anytime you think you may need emergency care. For example, call if: 
? · You have symptoms of sudden heart failure. These may include: ¨ Severe trouble breathing. ¨ A fast or irregular heartbeat. ¨ Coughing up pink, foamy mucus. ¨ You passed out. ? · You have symptoms of a stroke. These may include: 
¨ Sudden numbness, tingling, weakness, or loss of movement in your face, arm, or leg, especially on only one side of your body. ¨ Sudden vision changes. ¨ Sudden trouble speaking. ¨ Sudden confusion or trouble understanding simple statements. ¨ Sudden problems with walking or balance. ¨ A sudden, severe headache that is different from past headaches. ?Call your doctor now or seek immediate medical care if: 
? · You have new or changed symptoms of heart failure, such as: ¨ New or increased shortness of breath. ¨ New or worse swelling in your legs, ankles, or feet. ¨ Sudden weight gain, such as more than 2 to 3 pounds in a day or 5 pounds in a week. (Your doctor may suggest a different range of weight gain.) ¨ Feeling dizzy or lightheaded or like you may faint. ¨ Feeling so tired or weak that you cannot do your usual activities. ¨ Not sleeping well. Shortness of breath wakes you at night. You need extra pillows to prop yourself up to breathe easier. ? Watch closely for changes in your health, and be sure to contact your doctor if: 
? · You do not get better as expected. Where can you learn more? Go to http://jaleel-tadeo.info/. Enter K993 in the search box to learn more about \"Bradycardia: Care Instructions. \" Current as of: September 21, 2016 Content Version: 11.4 © 9815-0264 [employer]boxx. Care instructions adapted under license by [employer]renchguys mobile (which disclaims liability or warranty for this information). If you have questions about a medical condition or this instruction, always ask your healthcare professional. [place] [address] any warranty or liability for your use of this information. Bradycardia: Care Instructions Your Care Instructions Bradycardia is a slow heart rate. If your heart beats too slowly, it can't supply your body with enough blood. This can make you weak or dizzy. Or it may make you pass out. Sometimes medicine can cause this problem. If this happens, your doctor may have you adjust one of your medicines. If a medicine is not the problem, your doctor may recommend a pacemaker.  
It is important to treat bradycardia so that you don't get more serious health problems. Your doctor will want to see you on a routine schedule to make sure that your heartbeat is normal. 
Follow-up care is a key part of your treatment and safety. Be sure to make and go to all appointments, and call your doctor if you are having problems. It's also a good idea to know your test results and keep a list of the medicines you take. How can you care for yourself at home? · Take your medicines exactly as prescribed. Call your doctor if you think you are having a problem with your medicine. If your bradycardia is caused by another disease, your doctor will try to treat the disease. If it is caused by heart medicines, he or she will adjust your medicines. · Make lifestyle changes to improve your heart health. ¨ Get regular exercise. Try for 30 minutes on most days of the week. If you do not have other heart problems, you likely do not have limits on the type or level of activity that you can do. You may want to walk, swim, bike, or do other activities. Ask your doctor what level of exercise is safe for you. ¨ To control your cholesterol, avoid foods with a lot of fat, saturated fat, or sodium. Try to eat more fiber. And if your doctor says it's okay, get some exercise on most days. ¨ Do not smoke. Smoking can make your heart condition worse. If you need help quitting, talk to your doctor about stop-smoking programs and medicines. These can increase your chances of quitting for good. ¨ Limit alcohol to 2 drinks a day for men and 1 drink a day for women. Too much alcohol can cause health problems. Pacemaker If you have a pacemaker, you will get more specific information about it. Be sure to: · Check your pulse as your doctor tells you. · Have your pacemaker checked as often as your doctor recommends. You may be able to do this over the phone or computer. · Avoid strong magnetic or electrical fields.  These include wand metal detectors used in airports, MRIs, welding equipment, and generators. · You will be checked several times right after you get your pacemaker and when it is time to have the battery changed. Batteries last for 5 to 15 years. · You can talk on a cell phone. But keep it 6 inches away from your pacemaker. · Microwaves, TVs, radios, and kitchen and bathroom appliances won't harm you. When should you call for help? Call 911 anytime you think you may need emergency care. For example, call if: 
? · You have symptoms of sudden heart failure. These may include: ¨ Severe trouble breathing. ¨ A fast or irregular heartbeat. ¨ Coughing up pink, foamy mucus. ¨ You passed out. ? · You have symptoms of a stroke. These may include: 
¨ Sudden numbness, tingling, weakness, or loss of movement in your face, arm, or leg, especially on only one side of your body. ¨ Sudden vision changes. ¨ Sudden trouble speaking. ¨ Sudden confusion or trouble understanding simple statements. ¨ Sudden problems with walking or balance. ¨ A sudden, severe headache that is different from past headaches. ?Call your doctor now or seek immediate medical care if: 
? · You have new or changed symptoms of heart failure, such as: ¨ New or increased shortness of breath. ¨ New or worse swelling in your legs, ankles, or feet. ¨ Sudden weight gain, such as more than 2 to 3 pounds in a day or 5 pounds in a week. (Your doctor may suggest a different range of weight gain.) ¨ Feeling dizzy or lightheaded or like you may faint. ¨ Feeling so tired or weak that you cannot do your usual activities. ¨ Not sleeping well. Shortness of breath wakes you at night. You need extra pillows to prop yourself up to breathe easier. ? Watch closely for changes in your health, and be sure to contact your doctor if: 
? · You do not get better as expected. Where can you learn more? Go to http://jaleel-tadeo.info/. Enter G207 in the search box to learn more about \"Bradycardia: Care Instructions. \" Current as of: September 21, 2016 Content Version: 11.4 © 7708-7947 Solairedirect. Care instructions adapted under license by [employer] (which disclaims liability or warranty for this information). If you have questions about a medical condition or this instruction, always ask your healthcare professional. [address] any warranty or liability for your use of this information. [facility] Announcement We are excited to announce that we are making your provider's discharge notes available to you in [facility]. You will see these notes when they are completed and signed by the physician that discharged you from your recent hospital stay. If you have any questions or concerns about any information you see in [facility], please call the Health Information Department where you were seen or reach out to your Primary Care Provider for more information about your plan of care. Introducing [facility]! [employer] introduces [facility] patient portal. Now you can access parts of your medical record, email your doctor's office, and request medication refills online. 1. In your internet browser, go to [email or web address] 2. Click on the First Time User? Click Here link in the Sign In box. You will see the New Member Sign Up page. 3. Enter your [facility] Access Code exactly as it appears below. You will not need to use this code after youve completed the sign-up process. If you do not sign up before the expiration date, you must request a new code. · [facility] Access Code: [email or web address] Expires: 10/3/2018  2:02 PM 
 
4. Enter the last four digits of your Social Security Number (xxxx) and Date of Birth (mm/dd/yyyy) as indicated and click Submit. You will be taken to the next sign-up page. 5. Create a [employer] ID. This will be your [employer] login ID and cannot be changed, so think of one that is secure and easy to remember. 6. Create a [employer] password. You can change your password at any time. 7. Enter your Password Reset Question and Answer. This can be used at a later time if you forget your password. 8. Enter your e-mail address. You will receive e-mail notification when new information is available in [address] 9. Click Sign Up. You can now view and download portions of your medical record. 10. Click the Download Summary menu link to download a portable copy of your medical information. If you have questions, please visit the Frequently Asked Questions section of the [employer] website. Remember, [employer] is NOT to be used for urgent needs. For medical emergencies, dial 911. Now available from your iPhone and Android! Introducing Markell Cunningham As a [facility] patient, I wanted to make you aware of our electronic visit tool called Markell Jakenick. [facility] 24/7 allows you to connect within minutes with a medical provider 24 hours a day, seven days a week via a mobile device or tablet or logging into a secure website from your computer. You can access Markell Cunningham from anywhere in the [place]. A virtual visit might be right for you when you have a simple condition and feel like you just dont want to get out of bed, or cant get away from work for an appointment, when your regular [facility] provider is not available (evenings, weekends or holidays), or when youre out of town and need minor care. Electronic visits cost only $49 and if the [facility] 24/7 provider determines a prescription is needed to treat your condition, one can be electronically transmitted to a nearby pharmacy*. Please take a moment to enroll today if you have not already done so. The enrollment process is free and takes just a few minutes.   To enroll, please download the Francoise Faddavid 24/7 carlitos to your tablet or phone, or visit [email or web address] to enroll on your computer. And, as an [address] [place] [address] patient with a [employer] account, the results of your visits will be scanned into your electronic medical record and your primary care provider will be able to view the scanned results. We urge you to continue to see your regular Francoise Tegan provider for your ongoing medical care. And while your primary care provider may not be the one available when you seek a [facility] virtual visit, the peace of mind you get from getting a real diagnosis real time can be priceless. For more information on [facility], view our Frequently Asked Questions (FAQs) at [email or web address]. Sincerely, 
 
Jeff Toussaint MD 
Chief Medical Officer Kilo Sharmaine Cabrales *:  certain medications cannot be prescribed via [facility] Providers Seen During Your Hospitalization Provider Specialty Primary office phone Penny Dunlap MD Emergency Medicine [phone] Josh Blanc MD Family Practice [phone] Your Primary Care Physician (PCP) Primary Care Physician Office Phone Office Fax Dominik Grigsby [phone] You are allergic to the following No active allergies Recent Documentation Height Weight BMI Smoking Status 1.753 m 137.2 kg 44.67 kg/m2 Current Every Day Smoker Emergency Contacts Name Discharge Info Relation Home Work Mobile [id number] [facility] CAREGIVER [3] Spouse [3] [phone] Patient Belongings The following personal items are in your possession at time of discharge: 
  Dental Appliances: None  Visual Aid: None      Home Medications: None   Jewelry: None  Clothing: Pants, Shirt, Socks, Footwear    Other Valuables: Cell Phone (wallet and keys sent home with family ) Please provide this summary of care documentation to your next provider. Signatures-by signing, you are acknowledging that this After Visit Summary has been reviewed with you and you have received a copy. Patient Signature:  ____________________________________________________________ Date:  ____________________________________________________________  
  
[facility] Provider Signature:  ____________________________________________________________ Date:  ____________________________________________________________

## 2018-07-02 NOTE — PROGRESS NOTES
completed the initial Spiritual Assessment of the patient and family member in  Bed 6 of the emergency room and offered Pastoral Care support. . Patient does not have any Yarsanism/cultural needs that will affect patients preferences in health care. Chaplains will continue to follow and will provide pastoral care on an as needed/requested basis.     Isidro Fuentes  [facility] Care Department  [phone]

## 2018-07-02 NOTE — ED PROVIDER NOTES
EMERGENCY DEPARTMENT HISTORY AND PHYSICAL EXAM    12:00 PM      Date: 7/2/2018  Patient Name: Lesa Fleischer    History of Presenting Illness     Chief Complaint   Patient presents with    Dizziness    Irregular Heart Beat    Shortness of Breath         History Provided By: Patient    Chief Complaint: Palpations   Duration:  2 days  Timing:  Intermittent  Location: N/A  Quality: it feels like my head is not getting enough oxygen\"  Severity: N/A  Modifying Factors: N/A  Associated Symptoms: Dizziness, light-headedness, and diaphoresis. Denies syncope an chest pain. Additional History (Context): Lesa Fleischer is a 34 y.o. male who presents with intermittent palpations that began 2 days ago. The patient has associated symptoms symptoms of dizziness, light-headedness, and diaphoresis. He describes his symptoms as \"it feels like my head is not getting enough oxygen. \" The patient denies syncope and chest pain. He states he has had similar symptoms in the past. The patient states he recently ran out of his medications. The patient smokes tobacco.    PCP: None    Current Outpatient Prescriptions   Medication Sig Dispense Refill    buprenorphine-naloxone 8-2 mg subl 2 Tabs by SubLINGual route daily. Indications: OPIOID WITHDRAWAL SYMPTOMS      propranolol LA (INDERAL LA) 60 mg SR capsule Take 60 mg by mouth daily. Indications: HYPERTENSION         Past History     Past Medical History:  Past Medical History:   Diagnosis Date    Hypertension        Past Surgical History:  No past surgical history on file. Family History:  No family history on file. Social History:  Social History   Substance Use Topics    Smoking status: Current Every Day Smoker    Smokeless tobacco: Never Used    Alcohol use No       Allergies:  No Known Allergies      Review of Systems       Review of Systems   Constitutional: Positive for chills and diaphoresis. Cardiovascular: Positive for palpitations.  Negative for chest pain.   Neurological: Positive for dizziness and light-headedness. Negative for syncope. All other systems reviewed and are negative. Physical Exam     Visit Vitals    /88 (BP 1 Location: Right arm, BP Patient Position: At rest)    Pulse (!) 51    Temp 98.3 °F (36.8 °C)    Resp 16    Ht 5' 9\" (1.753 m)    Wt 136.1 kg (300 lb)    SpO2 99%    BMI 44.3 kg/m2         Physical Exam   Constitutional: He is oriented to person, place, and time. He appears well-developed and well-nourished. No distress. HENT:   Head: Normocephalic and atraumatic. Mouth/Throat: Oropharynx is clear and moist.   Eyes: Conjunctivae and EOM are normal. Pupils are equal, round, and reactive to light. No scleral icterus. Left eye cataract. Neck: Normal range of motion. Neck supple. Cardiovascular: Normal rate, regular rhythm and normal heart sounds. No murmur heard. Pulmonary/Chest: Effort normal and breath sounds normal. No respiratory distress. Abdominal: Soft. Bowel sounds are normal. He exhibits no distension. There is no tenderness. Musculoskeletal: He exhibits no edema. Lymphadenopathy:     He has no cervical adenopathy. Neurological: He is alert and oriented to person, place, and time. Coordination normal.   Skin: Skin is warm and dry. No rash noted. Psychiatric: He has a normal mood and affect. His behavior is normal.   Nursing note and vitals reviewed.         Diagnostic Study Results     Labs -  Recent Results (from the past 12 hour(s))   CBC WITH AUTOMATED DIFF    Collection Time: 07/02/18 12:22 PM   Result Value Ref Range    WBC 8.6 4.6 - 13.2 K/uL    RBC 5.12 4.70 - 5.50 M/uL    HGB 15.4 13.0 - 16.0 g/dL    HCT 44.8 36.0 - 48.0 %    MCV 87.5 74.0 - 97.0 FL    MCH 30.1 24.0 - 34.0 PG    MCHC 34.4 31.0 - 37.0 g/dL    RDW 13.3 11.6 - 14.5 %    PLATELET 030 098 - 719 K/uL    MPV 11.4 9.2 - 11.8 FL    NEUTROPHILS 67 40 - 73 %    LYMPHOCYTES 23 21 - 52 %    MONOCYTES 7 3 - 10 %    EOSINOPHILS 2 0 - 5 %    BASOPHILS 1 0 - 2 %    ABS. NEUTROPHILS 5.8 1.8 - 8.0 K/UL    ABS. LYMPHOCYTES 2.0 0.9 - 3.6 K/UL    ABS. MONOCYTES 0.6 0.05 - 1.2 K/UL    ABS. EOSINOPHILS 0.2 0.0 - 0.4 K/UL    ABS. BASOPHILS 0.0 0.0 - 0.06 K/UL    DF AUTOMATED     METABOLIC PANEL, COMPREHENSIVE    Collection Time: 07/02/18 12:22 PM   Result Value Ref Range    Sodium 140 136 - 145 mmol/L    Potassium 4.0 3.5 - 5.5 mmol/L    Chloride 105 100 - 108 mmol/L    CO2 28 21 - 32 mmol/L    Anion gap 7 3.0 - 18 mmol/L    Glucose 123 (H) 74 - 99 mg/dL    BUN 14 7.0 - 18 MG/DL    Creatinine 0.95 0.6 - 1.3 MG/DL    BUN/Creatinine ratio 15 12 - 20      GFR est AA >60 >60 ml/min/1.73m2    GFR est non-AA >60 >60 ml/min/1.73m2    Calcium 8.8 8.5 - 10.1 MG/DL    Bilirubin, total 0.5 0.2 - 1.0 MG/DL    ALT (SGPT) 22 16 - 61 U/L    AST (SGOT) 13 (L) 15 - 37 U/L    Alk. phosphatase 115 45 - 117 U/L    Protein, total 6.9 6.4 - 8.2 g/dL    Albumin 3.5 3.4 - 5.0 g/dL    Globulin 3.4 2.0 - 4.0 g/dL    A-G Ratio 1.0 0.8 - 1.7     MAGNESIUM    Collection Time: 07/02/18 12:22 PM   Result Value Ref Range    Magnesium 2.1 1.6 - 2.6 mg/dL   CARDIAC PANEL,(CK, CKMB & TROPONIN)    Collection Time: 07/02/18 12:22 PM   Result Value Ref Range    CK 96 39 - 308 U/L    CK - MB <1.0 <3.6 ng/ml    CK-MB Index  0.0 - 4.0 %     CALCULATION NOT PERFORMED WHEN RESULT IS BELOW LINEAR LIMIT    Troponin-I, Qt. <0.02 0.0 - 0.045 NG/ML       Radiologic Studies -   XR CHEST PORT   Final Result            Medical Decision Making   I am the first provider for this patient. I reviewed the vital signs, available nursing notes, past medical history, past surgical history, family history and social history. Vital Signs-Reviewed the patient's vital signs.     Pulse Oximetry Analysis -  99% on room air (Interpretation) nl    Records Reviewed: Nursing Notes and Old Medical Records (Time of Review: 12:00 PM)    ED Course: Progress Notes, Reevaluation, and Consults:    12:38 PM Patient bradycardic into the 40s. Symptomatic as he is complaining of lightheadedness and dizziness. Improved spontaneously. 12:54 PM Consult: I discussed care with Stevenson Gaucher, PA (Cardiology). It was a standard discussion including patient history, chief complaint, available diagnostic results, and predicted treatment course. Will see and consult on the patient. 1:42 PM Consult: I discussed care with Dr. Deshawn Saleh (Hospitalist). It was a standard discussion including patient history, chief complaint, available diagnostic results, and predicted treatment course. He accepts the patient. Provider Notes (Medical Decision Making):   MDM  Number of Diagnoses or Management Options  Bradycardia:   Diagnosis management comments: Complaints of palpitations with near syncope mult times over last 3 days noted symptomatic bradycardia in ED     Initial ekg; nsr rate 62 no acute st changes    Ekg; repeat with bradycardia rate 46 loss of P waves pt was symptomatic with david pacer pads placed HR improved spontaneously     Pt taking beta blocker however no recent change in dose        Amount and/or Complexity of Data Reviewed  Clinical lab tests: ordered and reviewed  Tests in the radiology section of CPT®: ordered and reviewed      For Hospitalized Patients:    1. Hospitalization Decision Time:  The decision to hospitalize the patient was made by MARCIE Novak at 1:31 PM on 7/2/2018    2. Aspirin: Aspirin was given    Diagnosis     Clinical Impression:   1. Bradycardia        Disposition: Admit    Follow-up Information     None           Patient's Medications   Start Taking    No medications on file   Continue Taking    BUPRENORPHINE-NALOXONE 8-2 MG SUBL    2 Tabs by SubLINGual route daily. Indications: OPIOID WITHDRAWAL SYMPTOMS    PROPRANOLOL LA (INDERAL LA) 60 MG SR CAPSULE    Take 60 mg by mouth daily.  Indications: HYPERTENSION   These Medications have changed    No medications on file   Stop Taking    No medications on file     _______________________________    Attestations:  [place]shayan [id number] Jaime Aguilar acting as a scribe for and in the presence of Jonathan Puente MD      July 02, 2018 at 1:44 PM       Provider Attestation:      I personally performed the services described in the documentation, reviewed the documentation, as recorded by the scribe in my presence, and it accurately and completely records my words and actions.  July 02, 2018 at 1:44 PM - Jonathan Puente MD    _______________________________

## 2018-07-02 NOTE — H&P
GENERAL GENERIC H&P/CONSULT    Melony Lester is a 34 y.o. male with history of HTN presents to ED with complaints of intermittent palpations that began 2 days ago. He noted some light-headness, diaphresis and dizziness. He deneis any syncope, cp, or sob. He notes he is on propanolol however recently ran out. Upon arrival patient was found to have HR in 40's. Cardiology has been consulted. Patient will be admitted for symptomatic. Past Medical History:   Diagnosis Date    Hypertension       No past surgical history on file. Prior to Admission medications    Medication Sig Start Date End Date Taking? Authorizing Provider   buprenorphine-naloxone 8-2 mg subl 2 Tabs by SubLINGual route daily. Indications: OPIOID WITHDRAWAL SYMPTOMS    Phys Other, MD   propranolol LA (INDERAL LA) 60 mg SR capsule Take 60 mg by mouth daily. Indications: HYPERTENSION    Phys Other, MD     No Known Allergies   Social History   Substance Use Topics    Smoking status: Current Every Day Smoker    Smokeless tobacco: Never Used    Alcohol use No      No family history on file. Review of Systems   Constitutional: Negative. HENT: Negative. Eyes: Negative. Respiratory: Negative. Cardiovascular:        Palpitations   Gastrointestinal: Negative. Genitourinary: Negative. Musculoskeletal: Negative. Neurological: Negative for syncope. Objective:          Patient Vitals for the past 8 hrs:   BP Temp Pulse Resp SpO2 Height Weight   07/02/18 1223 179/88 98.3 °F (36.8 °C) (!) 51 - 99 % - -   07/02/18 1202 - - - 16 - 5' 9\" (1.753 m) 136.1 kg (300 lb)     Physical Exam   Constitutional: He is oriented to person, place, and time. He appears well-nourished. No distress. HENT:   Head: Normocephalic. Eyes:   Cataracts to left   Cardiovascular: Bradycardia present. Pulmonary/Chest: Effort normal and breath sounds normal.   Abdominal: Soft. Bowel sounds are normal.   Musculoskeletal: Normal range of motion. Neurological: He is alert and oriented to person, place, and time. Skin: He is not diaphoretic. Labs:    Recent Results (from the past 24 hour(s))   EKG, 12 LEAD, INITIAL    Collection Time: 07/02/18 12:05 PM   Result Value Ref Range    Ventricular Rate 62 BPM    Atrial Rate 62 BPM    P-R Interval 148 ms    QRS Duration 108 ms    Q-T Interval 408 ms    QTC Calculation (Bezet) 414 ms    Calculated P Axis 31 degrees    Calculated R Axis 14 degrees    Calculated T Axis 38 degrees    Diagnosis       Normal sinus rhythm  Nonspecific intraventricular conduction delay  When compared with ECG of 03-APR-2018 23:27,  No significant change was found  Confirmed by Stiven Steiner ([id number]) on 7/2/2018 2:30:09 PM     CBC WITH AUTOMATED DIFF    Collection Time: 07/02/18 12:22 PM   Result Value Ref Range    WBC 8.6 4.6 - 13.2 K/uL    RBC 5.12 4.70 - 5.50 M/uL    HGB 15.4 13.0 - 16.0 g/dL    HCT 44.8 36.0 - 48.0 %    MCV 87.5 74.0 - 97.0 FL    MCH 30.1 24.0 - 34.0 PG    MCHC 34.4 31.0 - 37.0 g/dL    RDW 13.3 11.6 - 14.5 %    PLATELET 173 343 - 592 K/uL    MPV 11.4 9.2 - 11.8 FL    NEUTROPHILS 67 40 - 73 %    LYMPHOCYTES 23 21 - 52 %    MONOCYTES 7 3 - 10 %    EOSINOPHILS 2 0 - 5 %    BASOPHILS 1 0 - 2 %    ABS. NEUTROPHILS 5.8 1.8 - 8.0 K/UL    ABS. LYMPHOCYTES 2.0 0.9 - 3.6 K/UL    ABS. MONOCYTES 0.6 0.05 - 1.2 K/UL    ABS. EOSINOPHILS 0.2 0.0 - 0.4 K/UL    ABS.  BASOPHILS 0.0 0.0 - 0.06 K/UL    DF AUTOMATED     METABOLIC PANEL, COMPREHENSIVE    Collection Time: 07/02/18 12:22 PM   Result Value Ref Range    Sodium 140 136 - 145 mmol/L    Potassium 4.0 3.5 - 5.5 mmol/L    Chloride 105 100 - 108 mmol/L    CO2 28 21 - 32 mmol/L    Anion gap 7 3.0 - 18 mmol/L    Glucose 123 (H) 74 - 99 mg/dL    BUN 14 7.0 - 18 MG/DL    Creatinine 0.95 0.6 - 1.3 MG/DL    BUN/Creatinine ratio 15 12 - 20      GFR est AA >60 >60 ml/min/1.73m2    GFR est non-AA >60 >60 ml/min/1.73m2    Calcium 8.8 8.5 - 10.1 MG/DL    Bilirubin, total 0.5 0.2 - 1.0 MG/DL    ALT (SGPT) 22 16 - 61 U/L    AST (SGOT) 13 (L) 15 - 37 U/L    Alk.  phosphatase 115 45 - 117 U/L    Protein, total 6.9 6.4 - 8.2 g/dL    Albumin 3.5 3.4 - 5.0 g/dL    Globulin 3.4 2.0 - 4.0 g/dL    A-G Ratio 1.0 0.8 - 1.7     MAGNESIUM    Collection Time: 07/02/18 12:22 PM   Result Value Ref Range    Magnesium 2.1 1.6 - 2.6 mg/dL   CARDIAC PANEL,(CK, CKMB & TROPONIN)    Collection Time: 07/02/18 12:22 PM   Result Value Ref Range    CK 96 39 - 308 U/L    CK - MB <1.0 <3.6 ng/ml    CK-MB Index  0.0 - 4.0 %     CALCULATION NOT PERFORMED WHEN RESULT IS BELOW LINEAR LIMIT    Troponin-I, Qt. <0.02 0.0 - 0.045 NG/ML   EKG, 12 LEAD, SUBSEQUENT    Collection Time: 07/02/18  2:29 PM   Result Value Ref Range    Ventricular Rate 43 BPM    Atrial Rate 43 BPM    P-R Interval 136 ms    QRS Duration 100 ms    Q-T Interval 446 ms    QTC Calculation (Bezet) 376 ms    Calculated P Axis 33 degrees    Calculated R Axis 39 degrees    Calculated T Axis 38 degrees    Diagnosis       Marked sinus bradycardia with sinus arrhythmia  Abnormal ECG  When compared with ECG of 02-JUL-2018 12:48,  premature atrial complexes are no longer present  Confirmed by Poly Hale ([id number]) on 7/2/2018 2:33:28 PM             Assessment:  Active Problems:    Bradycardia (7/2/2018)        Plan:    Symptomatic Bradycardia  -Cards onboard  -EKG shows no STEMI  -hold propalolo  -check TSH  -ECHo  -defer further workup to Cards    Essential HTN  -monitor for now  -HCTZ for now   -holding BB    DVT prophyalxis  -scd/teds  -heparin sq    Signed:  Rosanne Rutledge NP 7/2/2018

## 2018-07-03 ENCOUNTER — APPOINTMENT (OUTPATIENT)
Dept: NON INVASIVE DIAGNOSTICS | Age: 38
DRG: 309 | End: 2018-07-03
Attending: NURSE PRACTITIONER
Payer: SELF-PAY

## 2018-07-03 PROBLEM — I10 HTN (HYPERTENSION): Chronic | Status: ACTIVE | Noted: 2018-07-03

## 2018-07-03 PROBLEM — F41.9 ANXIETY: Status: ACTIVE | Noted: 2018-07-03

## 2018-07-03 LAB
ANION GAP SERPL CALC-SCNC: 7 MMOL/L (ref 3–18)
BUN SERPL-MCNC: 10 MG/DL (ref 7–18)
BUN/CREAT SERPL: 13 (ref 12–20)
CALCIUM SERPL-MCNC: 8.4 MG/DL (ref 8.5–10.1)
CHLORIDE SERPL-SCNC: 107 MMOL/L (ref 100–108)
CO2 SERPL-SCNC: 27 MMOL/L (ref 21–32)
CREAT SERPL-MCNC: 0.8 MG/DL (ref 0.6–1.3)
ECHO AV PEAK GRADIENT: 0 MMHG
ECHO AV PEAK VELOCITY: 0 CM/S
ECHO LA VOL 2C: 63.8 ML (ref 18–58)
ECHO LA VOL 4C: 53.24 ML (ref 18–58)
ECHO LA VOLUME INDEX A2C: 26.18 ML/M2
ECHO LA VOLUME INDEX A4C: 21.85 ML/M2
ECHO LV EDV A2C: 147 ML
ECHO LV EDV A4C: 155.9 ML
ECHO LV EDV BP: 159.1 ML (ref 67–155)
ECHO LV EDV INDEX A4C: 64 ML/M2
ECHO LV EDV INDEX BP: 65.3 ML/M2
ECHO LV EDV NDEX A2C: 60.3 ML/M2
ECHO LV EJECTION FRACTION A2C: 74 %
ECHO LV EJECTION FRACTION A4C: 71 %
ECHO LV EJECTION FRACTION BIPLANE: 73 % (ref 55–100)
ECHO LV ESV A2C: 38.6 ML
ECHO LV ESV A4C: 45.3 ML
ECHO LV ESV BP: 43 ML (ref 22–58)
ECHO LV ESV INDEX A2C: 15.8 ML/M2
ECHO LV ESV INDEX A4C: 18.6 ML/M2
ECHO LV ESV INDEX BP: 17.6 ML/M2
ECHO LV INTERNAL DIMENSION DIASTOLIC: 5.08 CM (ref 4.2–5.9)
ECHO LV INTERNAL DIMENSION SYSTOLIC: 3.59 CM
ECHO LV ISOVOLUMETRIC RELAXATION TIME (IVRT): 72.1 MS
ECHO LV IVSD: 1.21 CM (ref 0.6–1)
ECHO LV MASS 2D: 287.5 G (ref 88–224)
ECHO LV MASS INDEX 2D: 118 G/M2
ECHO LV POSTERIOR WALL DIASTOLIC: 1.21 CM (ref 0.6–1)
ECHO LVOT DIAM: 2.09 CM
ECHO LVOT PEAK GRADIENT: 3 MMHG
ECHO LVOT PEAK VELOCITY: 86.78 CM/S
ECHO LVOT VTI: 17.6 CM
ECHO MV A VELOCITY: 60.44 CM/S
ECHO MV AREA PHT: 4 CM2
ECHO MV E DECELERATION TIME (DT): 190.3 MS
ECHO MV E VELOCITY: 0.76 CM/S
ECHO MV E/A RATIO: 1.3
ECHO MV PRESSURE HALF TIME (PHT): 55.2 MS
ECHO PVEIN A DURATION: 88.7 MS
ECHO PVEIN A VELOCITY: 29.26 CM/S
ERYTHROCYTE [DISTWIDTH] IN BLOOD BY AUTOMATED COUNT: 13.2 % (ref 11.6–14.5)
GLUCOSE SERPL-MCNC: 77 MG/DL (ref 74–99)
HCT VFR BLD AUTO: 42.1 % (ref 36–48)
HGB BLD-MCNC: 14.6 G/DL (ref 13–16)
MCH RBC QN AUTO: 30.1 PG (ref 24–34)
MCHC RBC AUTO-ENTMCNC: 34.7 G/DL (ref 31–37)
MCV RBC AUTO: 86.8 FL (ref 74–97)
PLATELET # BLD AUTO: 168 K/UL (ref 135–420)
PMV BLD AUTO: 11.4 FL (ref 9.2–11.8)
POTASSIUM SERPL-SCNC: 4 MMOL/L (ref 3.5–5.5)
RBC # BLD AUTO: 4.85 M/UL (ref 4.7–5.5)
SODIUM SERPL-SCNC: 141 MMOL/L (ref 136–145)
TROPONIN I SERPL-MCNC: <0.02 NG/ML (ref 0–0.04)
WBC # BLD AUTO: 9.3 K/UL (ref 4.6–13.2)

## 2018-07-03 PROCEDURE — 85027 COMPLETE CBC AUTOMATED: CPT | Performed by: NURSE PRACTITIONER

## 2018-07-03 PROCEDURE — 74011250636 HC RX REV CODE- 250/636: Performed by: NURSE PRACTITIONER

## 2018-07-03 PROCEDURE — 74011250637 HC RX REV CODE- 250/637: Performed by: NURSE PRACTITIONER

## 2018-07-03 PROCEDURE — 65660000000 HC RM CCU STEPDOWN

## 2018-07-03 PROCEDURE — 84484 ASSAY OF TROPONIN QUANT: CPT | Performed by: NURSE PRACTITIONER

## 2018-07-03 PROCEDURE — 80048 BASIC METABOLIC PNL TOTAL CA: CPT | Performed by: NURSE PRACTITIONER

## 2018-07-03 PROCEDURE — 36415 COLL VENOUS BLD VENIPUNCTURE: CPT | Performed by: NURSE PRACTITIONER

## 2018-07-03 PROCEDURE — 93306 TTE W/DOPPLER COMPLETE: CPT

## 2018-07-03 RX ORDER — DOXEPIN HYDROCHLORIDE 10 MG/1
10 CAPSULE ORAL 3 TIMES DAILY
Status: DISCONTINUED | OUTPATIENT
Start: 2018-07-03 | End: 2018-07-03 | Stop reason: RX

## 2018-07-03 RX ORDER — DOXEPIN HYDROCHLORIDE 10 MG/1
10 CAPSULE ORAL 3 TIMES DAILY
COMMUNITY
End: 2018-07-05

## 2018-07-03 RX ORDER — DOXEPIN HYDROCHLORIDE 10 MG/1
10 CAPSULE ORAL 3 TIMES DAILY
Status: DISCONTINUED | OUTPATIENT
Start: 2018-07-04 | End: 2018-07-03

## 2018-07-03 RX ORDER — DOXEPIN HYDROCHLORIDE 10 MG/1
10 CAPSULE ORAL 3 TIMES DAILY
Status: DISCONTINUED | OUTPATIENT
Start: 2018-07-04 | End: 2018-07-05 | Stop reason: HOSPADM

## 2018-07-03 RX ORDER — DOXEPIN HYDROCHLORIDE 25 MG/1
25 CAPSULE ORAL
Status: ACTIVE | OUTPATIENT
Start: 2018-07-03 | End: 2018-07-04

## 2018-07-03 RX ORDER — BUPRENORPHINE HYDROCHLORIDE AND NALOXONE HYDROCHLORIDE DIHYDRATE 8; 2 MG/1; MG/1
2 TABLET SUBLINGUAL DAILY
Status: DISCONTINUED | OUTPATIENT
Start: 2018-07-04 | End: 2018-07-04

## 2018-07-03 RX ADMIN — HEPARIN SODIUM 5000 UNITS: 5000 INJECTION, SOLUTION INTRAVENOUS; SUBCUTANEOUS at 09:11

## 2018-07-03 RX ADMIN — HEPARIN SODIUM 5000 UNITS: 5000 INJECTION, SOLUTION INTRAVENOUS; SUBCUTANEOUS at 00:24

## 2018-07-03 RX ADMIN — HEPARIN SODIUM 5000 UNITS: 5000 INJECTION, SOLUTION INTRAVENOUS; SUBCUTANEOUS at 17:30

## 2018-07-03 RX ADMIN — HYDROCHLOROTHIAZIDE 25 MG: 25 TABLET ORAL at 09:11

## 2018-07-03 NOTE — CDMP QUERY
Pt has documented Bmi of >40 , please document if this pt meets criteria for     =>Morbid obesity with bmi  of 44  =>Other Explanation of clinical findings  =>Unable to Determine (no explanation of clinical findings)    The medical record reflects the following:    Risk: bmi 44     Clinical Indicators: bmi 44     Treatment:  dietary consult     Please clarify and document your clinical opinion in the progress notes and discharge summary including the definitive and/or presumptive diagnosis, (suspected or probable), related to the above clinical findings. Please include clinical findings supporting your diagnosis. If you DECLINE this query or would like to communicate with [facility], please utilize the \"[employer] message box\" at the TOP of the Progress Note on the right.       Thank you,  Lashell Espino RN/CCDS  [phone]

## 2018-07-03 NOTE — ROUTINE PROCESS
0736 Received report from [address] . Patient alert and oriented. IVF infusing. 1600 Echocardiogram done. 1800 Paged Dr Gabbi Chaparro to inform of echocardiogram result x 2. No call back yet. 1918 Bedside and Verbal shift change report given to [address] (oncoming nurse) by Janie Goldstein RN (offgoing nurse). Report included the following information SBAR, Kardex, Intake/Output, MAR, Recent Results and Cardiac Rhythm NSR.

## 2018-07-03 NOTE — INTERDISCIPLINARY ROUNDS
IDR Summary      Patient: Joe Montano MRN: [id number]    Age: 34 y.o.  : 1980     Admit Diagnosis: Bradycardia      Problems pertinent to hospital stay:     Consults:Case Management     Testing due for patient today? YES    Nutrition plan:Yes     Mobility needs: Yes      Lines/Tubes:   IV: YES   Needed: YES  Loo: NO  Needed:NO  Central Line: NO Needed: NO      VTE Prophylaxis: Chemical      Care Management:  Discharge plan: home  PCP: Marjorie Reveles MD    : NO  Financial concerns:No   Interventions:       LOS: 1 days     Expected days until discharge: TBD      Signed:      [facility]  Hospitalist Division  Pager:  [phone]  Office:  [phone]

## 2018-07-03 NOTE — ROUTINE PROCESS
Bedside and Verbal shift change report given to Matt Bobo RN (oncoming nurse) by Sophia Wilcox RN (offgoing nurse). Report included the following information SBAR, Kardex and MAR.

## 2018-07-03 NOTE — MANAGEMENT PLAN
Discharge/Transition Planning    Interviewed patient. Verified demographics listed on face sheet with patient; all information correct. Pt with no insurance. Educated to fill out Med Assist paperwork. Patient stated their PCP is none. Educated on follow up and an PCP would be assigned to him. Patient lives in single family home with wife. Patient's NOK is wife. Patient independent with ADLs prior to admission. No use of DME prior to admission. Discharge plan is Home and follow up with pcp. Patient has designated _____wife___________________ to participate in his/her discharge plan and to receive any needed information. Name: Ava Base  -Address: same as pt  -Phone number: [phone]    Care Management Interventions  PCP Verified by CM: No (Needs PCP.  [place])  Mode of Transport at Discharge:  Other (see comment) (wife)  Transition of Care Consult (CM Consult): Discharge Planning  Current Support Network: Lives with Spouse, Own Home  Confirm Follow Up Transport: Self  Plan discussed with Pt/Family/Caregiver: Yes  Freedom of Choice Offered: Yes  Discharge Location  Discharge Placement: Home     Reason for Admission:   Bradycardia                   RRAT Score:     4                Plan for utilizing home health:      n/a                    Likelihood of Readmission:  low                         Transition of Care Plan:             Home      Rachell Quinn RN BSN  Outcomes Manager  Pager # [phone]

## 2018-07-03 NOTE — PROGRESS NOTES
Progress Note      Patient: Meme Macias               Sex: male          DOA: 7/2/2018       YOB: 1980      Age:  34 y.o.        LOS:  LOS: 1 day               Subjective:   Meme Macias is a 34 y.o. male  who presents with palpations and dizziness. He was admitted for symptomatic bradycardia. He has hx of hypertension , anxiety and Opioid addiction. He takes propranolol for HTN and doxepin for anxiety. These medications are on hold. Today his HR is improved. Cardiology consulting. His propranolol was discontinued and he is now on HCTZ for HTN control. ECHO is pending. He reports that he feels like he is not getting enough circulation to his head when he closes his eye. Otherwise no other complaints. Objective:      Visit Vitals    /90    Pulse 69    Temp 98.1 °F (36.7 °C)    Resp 18    Ht 5' 9\" (1.753 m)    Wt 133.8 kg (295 lb)    SpO2 99%    BMI 43.56 kg/m2           Physical Exam:  General:  alert, cooperative, no distress, appears stated age  Neurologic:  oriented  Lungs:  clear to auscultation bilaterally  Heart:  regular rate and rhythm, S1, S2 normal, no murmur, click, rub or gallop  Abdomen:  soft, non-tender.  Bowel sounds normal. No masses,  no organomegaly  Cardiovascular:  Regular rate and rhythm, S1S2 present, without murmur or extra heart sounds, pedal pulses normal and no edema  Skin:  Normal.    Intake and Output:  Current Shift:  07/03 0701 - 07/03 1900  In: -   Out: 200 [Urine:200]  Last three shifts:  07/01 1901 - 07/03 0700  In: 1404.2 [P.O.:660; I.V.:744.2]  Out: 925 [Urine:925]    Recent Results (from the past 48 hour(s))   EKG, 12 LEAD, INITIAL    Collection Time: 07/02/18 12:05 PM   Result Value Ref Range    Ventricular Rate 62 BPM    Atrial Rate 62 BPM    P-R Interval 148 ms    QRS Duration 108 ms    Q-T Interval 408 ms    QTC Calculation (Bezet) 414 ms    Calculated P Axis 31 degrees    Calculated R Axis 14 degrees    Calculated T Axis 38 degrees    Diagnosis       Normal sinus rhythm  Nonspecific intraventricular conduction delay  When compared with ECG of 03-APR-2018 23:27,  No significant change was found  Confirmed by Katy Duke ([id number]) on 7/2/2018 2:30:09 PM     CBC WITH AUTOMATED DIFF    Collection Time: 07/02/18 12:22 PM   Result Value Ref Range    WBC 8.6 4.6 - 13.2 K/uL    RBC 5.12 4.70 - 5.50 M/uL    HGB 15.4 13.0 - 16.0 g/dL    HCT 44.8 36.0 - 48.0 %    MCV 87.5 74.0 - 97.0 FL    MCH 30.1 24.0 - 34.0 PG    MCHC 34.4 31.0 - 37.0 g/dL    RDW 13.3 11.6 - 14.5 %    PLATELET 048 416 - 888 K/uL    MPV 11.4 9.2 - 11.8 FL    NEUTROPHILS 67 40 - 73 %    LYMPHOCYTES 23 21 - 52 %    MONOCYTES 7 3 - 10 %    EOSINOPHILS 2 0 - 5 %    BASOPHILS 1 0 - 2 %    ABS. NEUTROPHILS 5.8 1.8 - 8.0 K/UL    ABS. LYMPHOCYTES 2.0 0.9 - 3.6 K/UL    ABS. MONOCYTES 0.6 0.05 - 1.2 K/UL    ABS. EOSINOPHILS 0.2 0.0 - 0.4 K/UL    ABS. BASOPHILS 0.0 0.0 - 0.06 K/UL    DF AUTOMATED     METABOLIC PANEL, COMPREHENSIVE    Collection Time: 07/02/18 12:22 PM   Result Value Ref Range    Sodium 140 136 - 145 mmol/L    Potassium 4.0 3.5 - 5.5 mmol/L    Chloride 105 100 - 108 mmol/L    CO2 28 21 - 32 mmol/L    Anion gap 7 3.0 - 18 mmol/L    Glucose 123 (H) 74 - 99 mg/dL    BUN 14 7.0 - 18 MG/DL    Creatinine 0.95 0.6 - 1.3 MG/DL    BUN/Creatinine ratio 15 12 - 20      GFR est AA >60 >60 ml/min/1.73m2    GFR est non-AA >60 >60 ml/min/1.73m2    Calcium 8.8 8.5 - 10.1 MG/DL    Bilirubin, total 0.5 0.2 - 1.0 MG/DL    ALT (SGPT) 22 16 - 61 U/L    AST (SGOT) 13 (L) 15 - 37 U/L    Alk.  phosphatase 115 45 - 117 U/L    Protein, total 6.9 6.4 - 8.2 g/dL    Albumin 3.5 3.4 - 5.0 g/dL    Globulin 3.4 2.0 - 4.0 g/dL    A-G Ratio 1.0 0.8 - 1.7     MAGNESIUM    Collection Time: 07/02/18 12:22 PM   Result Value Ref Range    Magnesium 2.1 1.6 - 2.6 mg/dL   CARDIAC PANEL,(CK, CKMB & TROPONIN)    Collection Time: 07/02/18 12:22 PM   Result Value Ref Range    CK 96 39 - 308 U/L    CK - MB <1.0 <3.6 ng/ml    CK-MB Index  0.0 - 4.0 %     CALCULATION NOT PERFORMED WHEN RESULT IS BELOW LINEAR LIMIT    Troponin-I, Qt. <0.02 0.0 - 0.045 NG/ML   TSH 3RD GENERATION    Collection Time: 07/02/18 12:22 PM   Result Value Ref Range    TSH 0.92 0.36 - 3.74 uIU/mL   EKG, 12 LEAD, SUBSEQUENT    Collection Time: 07/02/18  2:29 PM   Result Value Ref Range    Ventricular Rate 43 BPM    Atrial Rate 43 BPM    P-R Interval 136 ms    QRS Duration 100 ms    Q-T Interval 446 ms    QTC Calculation (Bezet) 376 ms    Calculated P Axis 33 degrees    Calculated R Axis 39 degrees    Calculated T Axis 38 degrees    Diagnosis       Marked sinus bradycardia with sinus arrhythmia  Abnormal ECG  When compared with ECG of 02-JUL-2018 12:48,  premature atrial complexes are no longer present  Confirmed by Kurt Ascencio ([id number]) on 7/2/2018 2:33:28 PM     TROPONIN I    Collection Time: 07/02/18  7:00 PM   Result Value Ref Range    Troponin-I, Qt. <0.02 0.0 - 0.045 NG/ML   TROPONIN I    Collection Time: 07/03/18  4:21 AM   Result Value Ref Range    Troponin-I, Qt. <0.02 0.0 - 7.715 NG/ML   METABOLIC PANEL, BASIC    Collection Time: 07/03/18  4:21 AM   Result Value Ref Range    Sodium 141 136 - 145 mmol/L    Potassium 4.0 3.5 - 5.5 mmol/L    Chloride 107 100 - 108 mmol/L    CO2 27 21 - 32 mmol/L    Anion gap 7 3.0 - 18 mmol/L    Glucose 77 74 - 99 mg/dL    BUN 10 7.0 - 18 MG/DL    Creatinine 0.80 0.6 - 1.3 MG/DL    BUN/Creatinine ratio 13 12 - 20      GFR est AA >60 >60 ml/min/1.73m2    GFR est non-AA >60 >60 ml/min/1.73m2    Calcium 8.4 (L) 8.5 - 10.1 MG/DL   CBC W/O DIFF    Collection Time: 07/03/18  4:21 AM   Result Value Ref Range    WBC 9.3 4.6 - 13.2 K/uL    RBC 4.85 4.70 - 5.50 M/uL    HGB 14.6 13.0 - 16.0 g/dL    HCT 42.1 36.0 - 48.0 %    MCV 86.8 74.0 - 97.0 FL    MCH 30.1 24.0 - 34.0 PG    MCHC 34.7 31.0 - 37.0 g/dL    RDW 13.2 11.6 - 14.5 %    PLATELET 090 094 - 897 K/uL    MPV 11.4 9.2 - 11.8 FL       Lab/Data Reviewed:   All lab results for the last 24 hours reviewed. XRays were reviewed in past 24 hours    Medications Reviewed      Continued hospitalization is indicated due to Bradycardia . Assessment/Plan     Active Problems:    Bradycardia (7/2/2018)      HTN (hypertension) (7/3/2018)      Anxiety (7/3/2018)      Bradycardia   - improving   - propranolol discontinued per cardiology recommendation   - TSH wnl  - ECHO EF 55%. Left ventricular mild concentric hypertrophy observed. Age-appropriate left ventricular diastolic function. - cardiology following.  Appreciated     HTN  - Switched from propranolol to HCTZ  - Monitor BP closely    Hx Anxiety   - Doxepin PTA     HX Opioid addiction   - will confirm home medication and restart     DVT prophylaxis     Full code     Disposition : Home x 1 day         Woo Woodruff MD  July 3, 2018

## 2018-07-03 NOTE — PROGRESS NOTES
Problem: Falls - Risk of  Goal: *Absence of Falls  Document Colin Fall Risk and appropriate interventions in the flowsheet.   Outcome: Progressing Towards Goal  Fall Risk Interventions:

## 2018-07-04 LAB
ANION GAP SERPL CALC-SCNC: 6 MMOL/L (ref 3–18)
BUN SERPL-MCNC: 11 MG/DL (ref 7–18)
BUN/CREAT SERPL: 12 (ref 12–20)
CALCIUM SERPL-MCNC: 8.6 MG/DL (ref 8.5–10.1)
CHLORIDE SERPL-SCNC: 105 MMOL/L (ref 100–108)
CO2 SERPL-SCNC: 29 MMOL/L (ref 21–32)
CREAT SERPL-MCNC: 0.94 MG/DL (ref 0.6–1.3)
ERYTHROCYTE [DISTWIDTH] IN BLOOD BY AUTOMATED COUNT: 13.2 % (ref 11.6–14.5)
GLUCOSE SERPL-MCNC: 76 MG/DL (ref 74–99)
HCT VFR BLD AUTO: 44.3 % (ref 36–48)
HGB BLD-MCNC: 15.1 G/DL (ref 13–16)
MCH RBC QN AUTO: 29.6 PG (ref 24–34)
MCHC RBC AUTO-ENTMCNC: 34.1 G/DL (ref 31–37)
MCV RBC AUTO: 86.9 FL (ref 74–97)
PLATELET # BLD AUTO: 183 K/UL (ref 135–420)
PMV BLD AUTO: 11.7 FL (ref 9.2–11.8)
POTASSIUM SERPL-SCNC: 3.7 MMOL/L (ref 3.5–5.5)
RBC # BLD AUTO: 5.1 M/UL (ref 4.7–5.5)
SODIUM SERPL-SCNC: 140 MMOL/L (ref 136–145)
WBC # BLD AUTO: 8.6 K/UL (ref 4.6–13.2)

## 2018-07-04 PROCEDURE — 80048 BASIC METABOLIC PNL TOTAL CA: CPT | Performed by: NURSE PRACTITIONER

## 2018-07-04 PROCEDURE — 74011250636 HC RX REV CODE- 250/636: Performed by: NURSE PRACTITIONER

## 2018-07-04 PROCEDURE — 36415 COLL VENOUS BLD VENIPUNCTURE: CPT | Performed by: NURSE PRACTITIONER

## 2018-07-04 PROCEDURE — 74011250637 HC RX REV CODE- 250/637: Performed by: FAMILY MEDICINE

## 2018-07-04 PROCEDURE — 74011250637 HC RX REV CODE- 250/637: Performed by: NURSE PRACTITIONER

## 2018-07-04 PROCEDURE — 85027 COMPLETE CBC AUTOMATED: CPT | Performed by: NURSE PRACTITIONER

## 2018-07-04 PROCEDURE — 65660000000 HC RM CCU STEPDOWN

## 2018-07-04 RX ORDER — BUPRENORPHINE HYDROCHLORIDE AND NALOXONE HYDROCHLORIDE DIHYDRATE 8; 2 MG/1; MG/1
1 TABLET SUBLINGUAL 3 TIMES DAILY
Status: DISCONTINUED | OUTPATIENT
Start: 2018-07-04 | End: 2018-07-05 | Stop reason: HOSPADM

## 2018-07-04 RX ADMIN — DOXEPIN HYDROCHLORIDE 10 MG: 10 CAPSULE ORAL at 13:15

## 2018-07-04 RX ADMIN — DOXEPIN HYDROCHLORIDE 10 MG: 10 CAPSULE ORAL at 22:05

## 2018-07-04 RX ADMIN — HEPARIN SODIUM 5000 UNITS: 5000 INJECTION, SOLUTION INTRAVENOUS; SUBCUTANEOUS at 16:27

## 2018-07-04 RX ADMIN — HYDROCHLOROTHIAZIDE 25 MG: 25 TABLET ORAL at 09:10

## 2018-07-04 RX ADMIN — BUPRENORPHINE HYDROCHLORIDE AND NALOXONE HYDROCHLORIDE DIHYDRATE 1 TABLET: 8; 2 TABLET SUBLINGUAL at 22:00

## 2018-07-04 RX ADMIN — HEPARIN SODIUM 5000 UNITS: 5000 INJECTION, SOLUTION INTRAVENOUS; SUBCUTANEOUS at 09:10

## 2018-07-04 RX ADMIN — HEPARIN SODIUM 5000 UNITS: 5000 INJECTION, SOLUTION INTRAVENOUS; SUBCUTANEOUS at 00:06

## 2018-07-04 RX ADMIN — SODIUM CHLORIDE 50 ML/HR: 900 INJECTION, SOLUTION INTRAVENOUS at 07:15

## 2018-07-04 NOTE — PROGRESS NOTES
Problem: Falls - Risk of  Goal: *Absence of Falls  Document Colin Fall Risk and appropriate interventions in the flowsheet.    Outcome: Progressing Towards Goal  Fall Risk Interventions:            Medication Interventions: Patient to call before getting OOB         History of Falls Interventions: Door open when patient unattended, Room close to nurse's station

## 2018-07-04 NOTE — PROGRESS NOTES
Problem: Falls - Risk of  Goal: *Absence of Falls  Document Colin Fall Risk and appropriate interventions in the flowsheet.    Outcome: Progressing Towards Goal  Fall Risk Interventions:            Medication Interventions: Patient to call before getting OOB, Teach patient to arise slowly         History of Falls Interventions: Door open when patient unattended

## 2018-07-04 NOTE — PROGRESS NOTES
1040 [facility] to ask about suboxone that was sent to the safe per offgoing nurse. Pharmacy states has not been delivered by super. Nursing supervisor paged to ask about med. Pharm to dose doxepin per patient's at home dose. 8977 Updated patient on pharmacy approving suboxone and doxepin. Patient A&Ox4 with no c/o pain. Patient does c/o feeling his heart \"skip\" and that it makes him \"have anxiety. \" Patient currently SB-SR (50's-70's on monitor). Checked in with tele tech who will inform this RN if patient has ectopy. Head to toe assessment complete. 1616 patient requesting to only take 0.5 tab of his own suboxone. Order received from Dr. Hernan Villegas for one time dose of 0.5 mg suboxone. 1600 one tab dose to be held on [facility]. Spoke with Mekhi from pharmacy who requested nursing contacts patient's pain mgmt clinic in the morning to have form faxed over to this facility that states he does have active Rx for suboxone. Once received, paper should be brought to med records to be scanned in to chart. [address] unable to give 1/2 dose of suboxone per pharm. Patient declined 1600 dose and will take 2200 dose. Bedside and Verbal shift change report given to Evelyn Rodriguez (oncoming nurse) by Ben Stephenson RN   (offgoing nurse). Report included the following information SBAR, Kardex, Intake/Output, MAR and Med Rec Status.

## 2018-07-05 VITALS
HEIGHT: 69 IN | RESPIRATION RATE: 16 BRPM | BODY MASS INDEX: 44.8 KG/M2 | HEART RATE: 81 BPM | OXYGEN SATURATION: 97 % | SYSTOLIC BLOOD PRESSURE: 150 MMHG | TEMPERATURE: 98 F | WEIGHT: 302.5 LBS | DIASTOLIC BLOOD PRESSURE: 95 MMHG

## 2018-07-05 LAB
ANION GAP SERPL CALC-SCNC: 7 MMOL/L (ref 3–18)
BUN SERPL-MCNC: 13 MG/DL (ref 7–18)
BUN/CREAT SERPL: 13 (ref 12–20)
CALCIUM SERPL-MCNC: 8.7 MG/DL (ref 8.5–10.1)
CHLORIDE SERPL-SCNC: 104 MMOL/L (ref 100–108)
CO2 SERPL-SCNC: 28 MMOL/L (ref 21–32)
CREAT SERPL-MCNC: 1 MG/DL (ref 0.6–1.3)
ERYTHROCYTE [DISTWIDTH] IN BLOOD BY AUTOMATED COUNT: 13.2 % (ref 11.6–14.5)
GLUCOSE SERPL-MCNC: 78 MG/DL (ref 74–99)
HCT VFR BLD AUTO: 45.1 % (ref 36–48)
HGB BLD-MCNC: 15.7 G/DL (ref 13–16)
MCH RBC QN AUTO: 30.1 PG (ref 24–34)
MCHC RBC AUTO-ENTMCNC: 34.8 G/DL (ref 31–37)
MCV RBC AUTO: 86.6 FL (ref 74–97)
PLATELET # BLD AUTO: 174 K/UL (ref 135–420)
PMV BLD AUTO: 11.6 FL (ref 9.2–11.8)
POTASSIUM SERPL-SCNC: 3.8 MMOL/L (ref 3.5–5.5)
RBC # BLD AUTO: 5.21 M/UL (ref 4.7–5.5)
SODIUM SERPL-SCNC: 139 MMOL/L (ref 136–145)
WBC # BLD AUTO: 8.4 K/UL (ref 4.6–13.2)

## 2018-07-05 PROCEDURE — 80048 BASIC METABOLIC PNL TOTAL CA: CPT | Performed by: NURSE PRACTITIONER

## 2018-07-05 PROCEDURE — 36415 COLL VENOUS BLD VENIPUNCTURE: CPT | Performed by: NURSE PRACTITIONER

## 2018-07-05 PROCEDURE — 74011250636 HC RX REV CODE- 250/636: Performed by: NURSE PRACTITIONER

## 2018-07-05 PROCEDURE — 85027 COMPLETE CBC AUTOMATED: CPT | Performed by: NURSE PRACTITIONER

## 2018-07-05 PROCEDURE — 74011250637 HC RX REV CODE- 250/637: Performed by: NURSE PRACTITIONER

## 2018-07-05 PROCEDURE — 74011250637 HC RX REV CODE- 250/637: Performed by: FAMILY MEDICINE

## 2018-07-05 RX ORDER — HYDROCHLOROTHIAZIDE 25 MG/1
25 TABLET ORAL DAILY
Qty: 30 TAB | Refills: 0 | Status: SHIPPED | OUTPATIENT
Start: 2018-07-06 | End: 2018-08-06 | Stop reason: SDUPTHER

## 2018-07-05 RX ADMIN — SODIUM CHLORIDE 50 ML/HR: 900 INJECTION, SOLUTION INTRAVENOUS at 02:24

## 2018-07-05 RX ADMIN — HEPARIN SODIUM 5000 UNITS: 5000 INJECTION, SOLUTION INTRAVENOUS; SUBCUTANEOUS at 00:43

## 2018-07-05 RX ADMIN — HEPARIN SODIUM 5000 UNITS: 5000 INJECTION, SOLUTION INTRAVENOUS; SUBCUTANEOUS at 08:45

## 2018-07-05 RX ADMIN — DOXEPIN HYDROCHLORIDE 10 MG: 10 CAPSULE ORAL at 08:44

## 2018-07-05 RX ADMIN — HYDROCHLOROTHIAZIDE 25 MG: 25 TABLET ORAL at 08:44

## 2018-07-05 RX ADMIN — BUPRENORPHINE HYDROCHLORIDE AND NALOXONE HYDROCHLORIDE DIHYDRATE 1 TABLET: 8; 2 TABLET SUBLINGUAL at 09:00

## 2018-07-05 NOTE — ROUTINE PROCESS
Pt care received. Pt A/O x4. Pt resting in bed. Denies pain. Pt stable. Call light within reach, bed in lowest position and locked. Discharge instructions explained and understood by the patient. IV discontinued, no redness, swelling or pain noted. Pt discharged off the unit walking with Yuki Luevano RN    Pt's suboxone returned to him. 17 pills were counted. Pharmacist Lois aware of exchange.

## 2018-07-05 NOTE — DISCHARGE SUMMARY
DISCHARGE SUMMARY        PATIENT ID: Dick Gamez  MRN: [id number]   YOB: 1980   AGE: 34 y.o. DATE OF ADMISSION: 7/2/2018 11:57 AM    DATE OF DISCHARGE: 7/5/2018  PRIMARY CARE PROVIDER: Tra Matthews MD     Procedure Performed:  none      DISCHARGING PHYSICIAN: Salvador Mantilla MD    Call hospitalist office [phone]. Discharge Diagnosis:   Patient Active Problem List    Diagnosis Date Noted    HTN (hypertension) 07/03/2018    Anxiety 07/03/2018    Bradycardia 07/02/2018              CONSULTATIONS: IP CONSULT TO CARDIOLOGY  IP CONSULT TO HOSPITALIST    History of Present Ilness:    Philippe Freeman is a 34 y.o. male with history of HTN presents to ED with complaints of intermittent palpations that began 2 days ago. He noted some light-headness, diaphresis and dizziness. He deneis any syncope, cp, or sob. He notes he is on propanolol however recently ran out. Upon arrival patient was found to have HR in 40's. Cardiology has been consulted. Patient will be admitted for symptomatic. Hospital Course:     Patient was admitted for symptomatic bradycardia. He denied chest pain . On admission he was seen in consult by cardiology who recommended stopping his propanolol and he was switched to HCTZ for blood pressure control. His heart rate improved. ECHO and TSH were both unremarkable. However patient had episodes of sinus tachycardia lasting 3 sec seen on telemetry. Entire admission he did not have CP or hypotension. Patient has hx of anxiety and takes doxepin however he thinks the medication is causing him to have palpitations and was discontinued. At time of discharge patient was stable. He does not have a PCP now . He will need to try to get on on discharge for follow up. He agreed.        Physical Exam on Discharge:  Visit Vitals    BP (!) 150/95  Comment: notified Ugo RN    Pulse 81    Temp 98 °F (36.7 °C)    Resp 16    Ht 5' 9\" (1.753 m)    Wt 137.2 kg (302 lb 8 oz)    SpO2 97%    BMI 44.67 kg/m2       General:  Alert, cooperative, no distress, appears stated age. Lungs:   Clear to auscultation bilaterally. Chest wall:  No tenderness or deformity. Heart:  Regular rate and rhythm, S1, S2 normal, no murmur, click, rub or gallop. Abdomen:   Soft, non-tender. Bowel sounds normal. No masses,  No organomegaly. Extremities: Extremities normal, atraumatic, no cyanosis or edema. Pulses: 2+ and symmetric all extremities. Skin: Skin color, texture, turgor normal. No rashes or lesions   Neurologic: CNII-XII intact. Pertinent Results:    Recent Labs      07/05/18   0426   BUN  13   NA  139   CO2  28     Recent Labs      07/05/18   0426   WBC  8.4   RBC  5.21   HCT  45.1   MCV  86.6   MCH  30.1   MCHC  34.8   RDW  13.2       ECHO 7/3  · Calculated left ventricular ejection fraction is 55%. Left ventricular mild concentric hypertrophy observed. Age-appropriate left ventricular diastolic function. · Left atrial cavity size is mildly dilated. · Right ventricle not well visualized. Right ventricular cavity size is mildly dilated  · Right atrial cavity size is mildly dilated. · Aortic valve is probably trileaflet. · Mild mitral valve regurgitation.      EKG 7/2  Marked sinus bradycardia with sinus arrhythmia   Abnormal ECG   When compared with ECG of 02-JUL-2018 12:48,   premature atrial complexes are no longer present   Confirmed by Alin Ramey ([id number]) on 7/2/2018 2:33:28 PM                    DISCHARGE MEDICATIONS:   Current Discharge Medication List      START taking these medications    Details   hydroCHLOROthiazide (HYDRODIURIL) 25 mg tablet Take 1 Tab by mouth daily for 30 days. Qty: 30 Tab, Refills: 0         CONTINUE these medications which have NOT CHANGED    Details   buprenorphine-naloxone 8-2 mg subl 2 Tabs by SubLINGual route daily.  Indications: OPIOID WITHDRAWAL SYMPTOMS         STOP taking these medications       doxepin (SINEQUAN) 10 mg capsule Comments:   Reason for Stopping:         propranolol LA (INDERAL LA) 60 mg SR capsule Comments:   Reason for Stopping:               Condition at discharge:  Afebrile  Ambulating  Eating, Drinking, Voiding  Stable    FOLLOW UP APPOINTMENTS:    Follow-up Information     Follow up With Details Comments Keerthi Burleson MD On 7/6/2018 7:30am [address] [place] [address]  [phone]             Disposition:  Home       Total discharge preparation time was 35  minutes.

## 2018-07-05 NOTE — DISCHARGE INSTRUCTIONS
Bradycardia: Care Instructions  Your Care Instructions    Bradycardia is a slow heart rate. If your heart beats too slowly, it can't supply your body with enough blood. This can make you weak or dizzy. Or it may make you pass out. Sometimes medicine can cause this problem. If this happens, your doctor may have you adjust one of your medicines. If a medicine is not the problem, your doctor may recommend a pacemaker. It is important to treat bradycardia so that you don't get more serious health problems. Your doctor will want to see you on a routine schedule to make sure that your heartbeat is normal.  Follow-up care is a key part of your treatment and safety. Be sure to make and go to all appointments, and call your doctor if you are having problems. It's also a good idea to know your test results and keep a list of the medicines you take. How can you care for yourself at home? · Take your medicines exactly as prescribed. Call your doctor if you think you are having a problem with your medicine. If your bradycardia is caused by another disease, your doctor will try to treat the disease. If it is caused by heart medicines, he or she will adjust your medicines. · Make lifestyle changes to improve your heart health. ¨ Get regular exercise. Try for 30 minutes on most days of the week. If you do not have other heart problems, you likely do not have limits on the type or level of activity that you can do. You may want to walk, swim, bike, or do other activities. Ask your doctor what level of exercise is safe for you. ¨ To control your cholesterol, avoid foods with a lot of fat, saturated fat, or sodium. Try to eat more fiber. And if your doctor says it's okay, get some exercise on most days. ¨ Do not smoke. Smoking can make your heart condition worse. If you need help quitting, talk to your doctor about stop-smoking programs and medicines. These can increase your chances of quitting for good.   ¨ Limit alcohol to 2 drinks a day for men and 1 drink a day for women. Too much alcohol can cause health problems. Pacemaker  If you have a pacemaker, you will get more specific information about it. Be sure to:  · Check your pulse as your doctor tells you. · Have your pacemaker checked as often as your doctor recommends. You may be able to do this over the phone or computer. · Avoid strong magnetic or electrical fields. These include wand metal detectors used in airports, MRIs, welding equipment, and generators. · You will be checked several times right after you get your pacemaker and when it is time to have the battery changed. Batteries last for 5 to 15 years. · You can talk on a cell phone. But keep it 6 inches away from your pacemaker. · Microwaves, TVs, radios, and kitchen and bathroom appliances won't harm you. When should you call for help? Call 911 anytime you think you may need emergency care. For example, call if:  ? · You have symptoms of sudden heart failure. These may include:  ¨ Severe trouble breathing. ¨ A fast or irregular heartbeat. ¨ Coughing up pink, foamy mucus. ¨ You passed out. ? · You have symptoms of a stroke. These may include:  ¨ Sudden numbness, tingling, weakness, or loss of movement in your face, arm, or leg, especially on only one side of your body. ¨ Sudden vision changes. ¨ Sudden trouble speaking. ¨ Sudden confusion or trouble understanding simple statements. ¨ Sudden problems with walking or balance. ¨ A sudden, severe headache that is different from past headaches. ?Call your doctor now or seek immediate medical care if:  ? · You have new or changed symptoms of heart failure, such as:  ¨ New or increased shortness of breath. ¨ New or worse swelling in your legs, ankles, or feet. ¨ Sudden weight gain, such as more than 2 to 3 pounds in a day or 5 pounds in a week.  (Your doctor may suggest a different range of weight gain.)  ¨ Feeling dizzy or lightheaded or like you may faint.  ¨ Feeling so tired or weak that you cannot do your usual activities. ¨ Not sleeping well. Shortness of breath wakes you at night. You need extra pillows to prop yourself up to breathe easier. ? Watch closely for changes in your health, and be sure to contact your doctor if:  ? · You do not get better as expected. Where can you learn more? Go to http://jaleel-tadeo.info/. Enter R509 in the search box to learn more about \"Bradycardia: Care Instructions. \"  Current as of: September 21, 2016  Content Version: 11.4  © 1618-8169 [employer]. Care instructions adapted under license by [employer] (which disclaims liability or warranty for this information). If you have questions about a medical condition or this instruction, always ask your healthcare professional. [address] any warranty or liability for your use of this information. Bradycardia: Care Instructions  Your Care Instructions    Bradycardia is a slow heart rate. If your heart beats too slowly, it can't supply your body with enough blood. This can make you weak or dizzy. Or it may make you pass out. Sometimes medicine can cause this problem. If this happens, your doctor may have you adjust one of your medicines. If a medicine is not the problem, your doctor may recommend a pacemaker. It is important to treat bradycardia so that you don't get more serious health problems. Your doctor will want to see you on a routine schedule to make sure that your heartbeat is normal.  Follow-up care is a key part of your treatment and safety. Be sure to make and go to all appointments, and call your doctor if you are having problems. It's also a good idea to know your test results and keep a list of the medicines you take. How can you care for yourself at home? · Take your medicines exactly as prescribed. Call your doctor if you think you are having a problem with your medicine.  If your bradycardia is caused by another disease, your doctor will try to treat the disease. If it is caused by heart medicines, he or she will adjust your medicines. · Make lifestyle changes to improve your heart health. ¨ Get regular exercise. Try for 30 minutes on most days of the week. If you do not have other heart problems, you likely do not have limits on the type or level of activity that you can do. You may want to walk, swim, bike, or do other activities. Ask your doctor what level of exercise is safe for you. ¨ To control your cholesterol, avoid foods with a lot of fat, saturated fat, or sodium. Try to eat more fiber. And if your doctor says it's okay, get some exercise on most days. ¨ Do not smoke. Smoking can make your heart condition worse. If you need help quitting, talk to your doctor about stop-smoking programs and medicines. These can increase your chances of quitting for good. ¨ Limit alcohol to 2 drinks a day for men and 1 drink a day for women. Too much alcohol can cause health problems. Pacemaker  If you have a pacemaker, you will get more specific information about it. Be sure to:  · Check your pulse as your doctor tells you. · Have your pacemaker checked as often as your doctor recommends. You may be able to do this over the phone or computer. · Avoid strong magnetic or electrical fields. These include wand metal detectors used in airports, MRIs, welding equipment, and generators. · You will be checked several times right after you get your pacemaker and when it is time to have the battery changed. Batteries last for 5 to 15 years. · You can talk on a cell phone. But keep it 6 inches away from your pacemaker. · Microwaves, TVs, radios, and kitchen and bathroom appliances won't harm you. When should you call for help? Call 911 anytime you think you may need emergency care. For example, call if:  ? · You have symptoms of sudden heart failure.  These may include:  ¨ Severe trouble breathing. ¨ A fast or irregular heartbeat. ¨ Coughing up pink, foamy mucus. ¨ You passed out. ? · You have symptoms of a stroke. These may include:  ¨ Sudden numbness, tingling, weakness, or loss of movement in your face, arm, or leg, especially on only one side of your body. ¨ Sudden vision changes. ¨ Sudden trouble speaking. ¨ Sudden confusion or trouble understanding simple statements. ¨ Sudden problems with walking or balance. ¨ A sudden, severe headache that is different from past headaches. ?Call your doctor now or seek immediate medical care if:  ? · You have new or changed symptoms of heart failure, such as:  ¨ New or increased shortness of breath. ¨ New or worse swelling in your legs, ankles, or feet. ¨ Sudden weight gain, such as more than 2 to 3 pounds in a day or 5 pounds in a week. (Your doctor may suggest a different range of weight gain.)  ¨ Feeling dizzy or lightheaded or like you may faint. ¨ Feeling so tired or weak that you cannot do your usual activities. ¨ Not sleeping well. Shortness of breath wakes you at night. You need extra pillows to prop yourself up to breathe easier. ? Watch closely for changes in your health, and be sure to contact your doctor if:  ? · You do not get better as expected. Where can you learn more? Go to http://jaleel-tadeo.info/. Enter F888 in the search box to learn more about \"Bradycardia: Care Instructions. \"  Current as of: September 21, 2016  Content Version: 11.4  © 2714-4575 [employer]. Care instructions adapted under license by [employer] (which disclaims liability or warranty for this information). If you have questions about a medical condition or this instruction, always ask your healthcare professional. [address] any warranty or liability for your use of this information.

## 2018-07-05 NOTE — PROGRESS NOTES
Assumed care of pt. Pt resting in bed, watching tv. Family at the bedside. AxOx4. No c/o pain. NAD. Call light within reach. Will continue to monitor. 2205- due meds given. 5297- due med given. /    Steven De Jesus- pt resting comfortably in bed. No concerns at this time. Bedside shift change report given to Guillermina Mensah (oncoming nurse) by Deepthi Fernandez RN (offgoing nurse). Report included the following information SBAR, Kardex, Intake/Output, MAR, Accordion and Cardiac Rhythm Sinus [facility].

## 2018-07-05 NOTE — PROGRESS NOTES
conducted a Follow up consultation and Spiritual Assessment for Lesa Fleischer, who is a 34 y.o.,male. The  provided the following Interventions:  Continued the relationship of care and support. Listened empathically as patient shared that he just lost his job at [employer] and applied but has not been called. Offered actual prayer and assurance of continued prayer on patients behalf. Chart reviewed. The following outcomes were achieved:  Patient expressed gratitude for 's visit. Assessment:  There are no further spiritual or Congregational issues which require Spiritual Care Services interventions at this time. Patient was having \"chest pain/heartburn\" when seen. Nurse was called to the room. Patient will be discharged today as confirmed by the chart nurse.   Plan:  Chaplains will continue to follow and will provide pastoral care on an as needed/requested basis while patient is still in the hospital.          Intern [address] [place] [address]   [phone]

## 2018-07-06 ENCOUNTER — OFFICE VISIT (OUTPATIENT)
Dept: INTERNAL MEDICINE CLINIC | Age: 38
End: 2018-07-06

## 2018-07-06 VITALS
SYSTOLIC BLOOD PRESSURE: 137 MMHG | HEIGHT: 69 IN | OXYGEN SATURATION: 100 % | WEIGHT: 285 LBS | RESPIRATION RATE: 20 BRPM | DIASTOLIC BLOOD PRESSURE: 93 MMHG | BODY MASS INDEX: 42.21 KG/M2 | TEMPERATURE: 97.4 F | HEART RATE: 91 BPM

## 2018-07-06 DIAGNOSIS — F41.9 ANXIETY: ICD-10-CM

## 2018-07-06 DIAGNOSIS — R45.89 DEPRESSED MOOD: ICD-10-CM

## 2018-07-06 DIAGNOSIS — I10 ESSENTIAL HYPERTENSION: Primary | Chronic | ICD-10-CM

## 2018-07-06 PROBLEM — E66.01 OBESITY, MORBID (HCC): Status: ACTIVE | Noted: 2018-07-06

## 2018-07-06 RX ORDER — SERTRALINE HYDROCHLORIDE 50 MG/1
25 TABLET, FILM COATED ORAL DAILY
Qty: 60 TAB | Refills: 0 | Status: SHIPPED | OUTPATIENT
Start: 2018-07-06 | End: 2018-08-06

## 2018-07-06 NOTE — PATIENT INSTRUCTIONS

## 2018-07-06 NOTE — PROGRESS NOTES
HISTORY OF PRESENT ILLNESS  Amy Sharma is a 34 y.o. male. HPI Comments: 35 yo male here to establish care, f/u of HTN, palpitations. He is accompanied by his wife. He was recently hospitalized at [facility] - due to increased palpitations. Admitted due to bradycardia. He had been on propranolol for anxiety sx which was discontinued. He was started on HCTZ yesterday to treat his HTN. Continue to have palpitations/anxiety sx in hospital but cardiac evaluation was unremarkable. Had TTE with mild LVH. He reports h/o alcohol dependence. He is followed by mental health/addiction specialist (unfortunately his doctor recently ) and is treated with buprenophine-naloxone for the past 4 years. He had been a smoker but quit during this hospital stay and reports no desire to smoke at this time. His wife is concerned that he is depressed. He denies being dx with this but does report being on Prozac in the past which he found helpful. Recently lost his job as . Denies SI/HI now or in the past.   BMI 42. He reports he has loss > 100lbs in the past. Typically only eats two meals, cereal for breakfast then dinner. Review of Systems   Constitutional: Negative for chills, fever and weight loss. HENT: Negative for congestion and ear pain. Eyes: Negative for blurred vision and pain. Respiratory: Negative for cough and shortness of breath. Cardiovascular: Positive for palpitations. Negative for chest pain and leg swelling. Gastrointestinal: Negative for abdominal pain, nausea and vomiting. Genitourinary: Negative for frequency and urgency. Musculoskeletal: Negative for joint pain and myalgias. Skin: Negative for itching and rash. Neurological: Negative for dizziness, tingling and headaches. Psychiatric/Behavioral: Positive for depression and substance abuse (in the past). Negative for suicidal ideas. The patient is nervous/anxious and has insomnia.       Past Medical History: Diagnosis Date    Anxiety     Hypertension      Past Surgical History:   Procedure Laterality Date    HX HEENT     . Current Outpatient Prescriptions on File Prior to Visit   Medication Sig Dispense Refill    hydroCHLOROthiazide (HYDRODIURIL) 25 mg tablet Take 1 Tab by mouth daily for 30 days. 30 Tab 0    buprenorphine-naloxone 8-2 mg subl 2 Tabs by SubLINGual route daily. Indications: OPIOID WITHDRAWAL SYMPTOMS       Current Facility-Administered Medications on File Prior to Visit   Medication Dose Route Frequency Provider Last Rate Last Dose    [DISCONTINUED] buprenorphine-naloxone (SUBOXONE) 8-2mg SL tablet  (Patient Supplied)  1 Tab SubLINGual TID Jl Uribe MD   1 Tab at 07/05/18 0900    [DISCONTINUED] Please contact facility which writes Suboxone prescriptions for patients. Once contacted, have the facility send a fax to confirm patient is enrolled in a program with them  1 Each Other Mg Zhu MD        [DISCONTINUED] doxepin (SINEquan) capsule 10 mg  10 mg Oral TID Jl Uribe MD   10 mg at 07/05/18 0844    [DISCONTINUED] 0.9% sodium chloride infusion  50 mL/hr IntraVENous CONTINUOUS Saulo Goodwin NP 50 mL/hr at 07/05/18 0224 50 mL/hr at 07/05/18 0224    [DISCONTINUED] acetaminophen (TYLENOL) tablet 650 mg  650 mg Oral Q4H PRN Saulo Goodwin NP        [DISCONTINUED] heparin (porcine) injection 5,000 Units  5,000 Units SubCUTAneous Q8H Saulo Goodwin NP   5,000 Units at 07/05/18 0845    [DISCONTINUED] hydroCHLOROthiazide (HYDRODIURIL) tablet 25 mg  25 mg Oral DAILY Saulo Goodwin NP   25 mg at 07/05/18 0844   No Known Allergies  Social History   Substance Use Topics    Smoking status: Former Smoker    Smokeless tobacco: Never Used    Alcohol use No     Family History   Problem Relation Age of Onset    Hypertension Brother        Physical Exam   Constitutional: He appears well-developed and well-nourished. No distress.    BP (!) 137/93 (BP 1 Location: Right arm, BP Patient Position: Sitting)  Pulse 91  Temp 97.4 °F (36.3 °C) (Oral)   Resp 20  Ht 5' 9\" (1.753 m)  Wt 285 lb (129.3 kg)  SpO2 100%  BMI 42.09 kg/m2     HENT:   Right Ear: Tympanic membrane and ear canal normal.   Left Ear: Tympanic membrane and ear canal normal.   Nose: Right sinus exhibits no maxillary sinus tenderness and no frontal sinus tenderness. Left sinus exhibits no maxillary sinus tenderness and no frontal sinus tenderness. Mouth/Throat: No oropharyngeal exudate or posterior oropharyngeal erythema. Eyes: Right eye exhibits no discharge. Left eye exhibits no discharge. Neck: Neck supple. Cardiovascular: Normal rate, regular rhythm and normal heart sounds. Exam reveals no gallop and no friction rub. No murmur heard. Pulmonary/Chest: Effort normal and breath sounds normal. No respiratory distress. He has no wheezes. He has no rales. Abdominal: Soft. He exhibits no distension. There is no tenderness. There is no rebound and no guarding. Musculoskeletal: He exhibits no edema or tenderness. Lymphadenopathy:     He has no cervical adenopathy. Neurological: He is alert. He exhibits normal muscle tone. Skin: Skin is warm and dry. Psychiatric: His behavior is normal. Thought content normal.     Lab Results   Component Value Date/Time    Sodium 139 07/05/2018 04:26 AM    Potassium 3.8 07/05/2018 04:26 AM    Chloride 104 07/05/2018 04:26 AM    CO2 28 07/05/2018 04:26 AM    Anion gap 7 07/05/2018 04:26 AM    Glucose 78 07/05/2018 04:26 AM    BUN 13 07/05/2018 04:26 AM    Creatinine 1.00 07/05/2018 04:26 AM    BUN/Creatinine ratio 13 07/05/2018 04:26 AM    GFR est AA >60 07/05/2018 04:26 AM    GFR est non-AA >60 07/05/2018 04:26 AM    Calcium 8.7 07/05/2018 04:26 AM    Bilirubin, total 0.5 07/02/2018 12:22 PM    AST (SGOT) 13 (L) 07/02/2018 12:22 PM    Alk.  phosphatase 115 07/02/2018 12:22 PM    Protein, total 6.9 07/02/2018 12:22 PM    Albumin 3.5 07/02/2018 12:22 PM Globulin 3.4 07/02/2018 12:22 PM    A-G Ratio 1.0 07/02/2018 12:22 PM    ALT (SGPT) 22 07/02/2018 12:22 PM     Lab Results   Component Value Date/Time    WBC 8.4 07/05/2018 04:26 AM    Hemoglobin, POC 15.6 04/04/2018 01:42 AM    HGB 15.7 07/05/2018 04:26 AM    Hematocrit, POC 46 04/04/2018 01:42 AM    HCT 45.1 07/05/2018 04:26 AM    PLATELET 696 27/87/6465 04:26 AM    MCV 86.6 07/05/2018 04:26 AM     Lab Results   Component Value Date/Time    TSH 0.92 07/02/2018 12:22 PM   No results found for: CHOL, CHOLPOCT, CHOLX, CHLST, CHOLV, HDL, HDLPOC, LDL, LDLCPOC, LDLC, DLDLP, VLDLC, VLDL, TGLX, TRIGL, TRIGP, TGLPOCT, CHHD, CHHDX    ASSESSMENT and PLAN    ICD-10-CM ICD-9-CM    1. Essential hypertension I10 401.9    2. Anxiety F41.9 300.00    3. Depressed mood F32.9 311    4. BMI 40.0-44.9, adult ([facility] [address] Z68.41 V85.41      Will continue with HCTZ for now. RTC in one month to reassess. Can repeat labs including lipids at that time. Discussed importance of mental health f/u regarding anxiety/depression. He did feel better on SSRI in the past so will try Zoloft. Discussed dietary changes.  Lost 100+ lbs on DASH diet in the past. Provided info on this on AVS.

## 2018-07-06 NOTE — MR AVS SNAPSHOT
[facility] 
 
 
 [address] [facility] [address] [address] 
[phone] Patient: Dick Gamez MRN: [id number] SAMUEL:[id number] Visit Information Date & Time Provider Department Dept. Phone Encounter #  
 7/6/2018  7:30 AM Tra Matthews, [address] [facility] [address] [id number] Follow-up Instructions Return in about 1 month (around 8/6/2018) for hypertension. Upcoming Health Maintenance Date Due Pneumococcal 19-64 Medium Risk (1 of 1 - PPSV23) 3/18/1997 DTaP/Tdap/Td series (1 - Tdap) 3/18/1999 Influenza Age 5 to Adult 8/1/2018 Allergies as of 7/6/2018  Review Complete On: 7/6/2018 By: Emily Borja No Known Allergies Current Immunizations  Never Reviewed No immunizations on file. Not reviewed this visit You Were Diagnosed With   
  
 Codes Comments Essential hypertension    -  Primary ICD-10-CM: I10 
ICD-9-CM: 401.9 Anxiety     ICD-10-CM: F41.9 ICD-9-CM: 300.00 Depressed mood     ICD-10-CM: F32.9 ICD-9-CM: 870 BMI 40.0-44.9, adult [facility])     ICD-10-CM: Z68.41 
ICD-9-CM: V85.41 Vitals BP Pulse Temp Resp Height(growth percentile) Weight(growth percentile) (!) 137/93 (BP 1 Location: Right arm, BP Patient Position: Sitting) 91 97.4 °F (36.3 °C) (Oral) 20 5' 9\" (1.753 m) 285 lb (129.3 kg) SpO2 BMI Smoking Status 100% 42.09 kg/m2 Former Smoker BMI and BSA Data Body Mass Index Body Surface Area 42.09 kg/m 2 2.51 m 2 Preferred Pharmacy Pharmacy Name Phone [address] A[address] Goins [address], [address] [place] [address] [place] [phone] Your Updated Medication List  
  
   
This list is accurate as of 7/6/18  8:13 AM.  Always use your most recent med list.  
  
  
  
  
 buprenorphine-naloxone 8-2 mg Subl 2 Tabs by SubLINGual route daily. Indications: OPIOID WITHDRAWAL SYMPTOMS  
  
 hydroCHLOROthiazide 25 mg tablet Commonly known as:  HYDRODIURIL Take 1 Tab by mouth daily for 30 days. sertraline 50 mg tablet Commonly known as:  ZOLOFT Take 0.5 Tabs by mouth daily. May increase to 1 tab daily in 1 week  Indications: ANXIETY WITH DEPRESSION Prescriptions Sent to Pharmacy Refills  
 sertraline (ZOLOFT) 50 mg tablet 0 Sig: Take 0.5 Tabs by mouth daily. May increase to 1 tab daily in 1 week  Indications: ANXIETY WITH DEPRESSION Class: Normal  
 Pharmacy: [facility] DR JONE CANCHOLA [address], [address]  #: [phone] Route: Oral  
  
Follow-up Instructions Return in about 1 month (around 8/6/2018) for hypertension. Patient Instructions DASH Diet: Care Instructions Your Care Instructions The DASH diet is an eating plan that can help lower your blood pressure. DASH stands for Dietary Approaches to Stop Hypertension. Hypertension is high blood pressure. The DASH diet focuses on eating foods that are high in calcium, potassium, and magnesium. These nutrients can lower blood pressure. The foods that are highest in these nutrients are fruits, vegetables, low-fat dairy products, nuts, seeds, and legumes. But taking calcium, potassium, and magnesium supplements instead of eating foods that are high in those nutrients does not have the same effect. The DASH diet also includes whole grains, fish, and poultry. The DASH diet is one of several lifestyle changes your doctor may recommend to lower your high blood pressure. Your doctor may also want you to decrease the amount of sodium in your diet. Lowering sodium while following the DASH diet can lower blood pressure even further than just the DASH diet alone. Follow-up care is a key part of your treatment and safety. Be sure to make and go to all appointments, and call your doctor if you are having problems. It's also a good idea to know your test results and keep a list of the medicines you take. How can you care for yourself at home? Following the DASH diet · Eat 4 to 5 servings of fruit each day. A serving is 1 medium-sized piece of fruit, ½ cup chopped or canned fruit, 1/4 cup dried fruit, or 4 ounces (½ cup) of fruit juice. Choose fruit more often than fruit juice. · Eat 4 to 5 servings of vegetables each day. A serving is 1 cup of lettuce or raw leafy vegetables, ½ cup of chopped or cooked vegetables, or 4 ounces (½ cup) of vegetable juice. Choose vegetables more often than vegetable juice. · Get 2 to 3 servings of low-fat and fat-free dairy each day. A serving is 8 ounces of milk, 1 cup of yogurt, or 1 ½ ounces of cheese. · Eat 6 to 8 servings of grains each day. A serving is 1 slice of bread, 1 ounce of dry cereal, or ½ cup of cooked rice, pasta, or cooked cereal. Try to choose whole-grain products as much as possible. · Limit lean meat, poultry, and fish to 2 servings each day. A serving is 3 ounces, about the size of a deck of cards. · Eat 4 to 5 servings of nuts, seeds, and legumes (cooked dried beans, lentils, and split peas) each week. A serving is 1/3 cup of nuts, 2 tablespoons of seeds, or ½ cup of cooked beans or peas. · Limit fats and oils to 2 to 3 servings each day. A serving is 1 teaspoon of vegetable oil or 2 tablespoons of salad dressing. · Limit sweets and added sugars to 5 servings or less a week. A serving is 1 tablespoon jelly or jam, ½ cup sorbet, or 1 cup of lemonade. · Eat less than 2,300 milligrams (mg) of sodium a day. If you limit your sodium to 1,500 mg a day, you can lower your blood pressure even more. Tips for success · Start small. Do not try to make dramatic changes to your diet all at once. You might feel that you are missing out on your favorite foods and then be more likely to not follow the plan. Make small changes, and stick with them. Once those changes become habit, add a few more changes. · Try some of the following: ¨ Make it a goal to eat a fruit or vegetable at every meal and at snacks. This will make it easy to get the recommended amount of fruits and vegetables each day. ¨ Try yogurt topped with fruit and nuts for a snack or healthy dessert. ¨ Add lettuce, tomato, cucumber, and onion to sandwiches. ¨ Combine a ready-made pizza crust with low-fat mozzarella cheese and lots of vegetable toppings. Try using tomatoes, squash, spinach, broccoli, carrots, cauliflower, and onions. ¨ Have a variety of cut-up vegetables with a low-fat dip as an appetizer instead of chips and dip. ¨ Sprinkle sunflower seeds or chopped almonds over salads. Or try adding chopped walnuts or almonds to cooked vegetables. ¨ Try some vegetarian meals using beans and peas. Add garbanzo or kidney beans to salads. Make burritos and tacos with mashed sopina beans or black beans. Where can you learn more? Go to http://[employer]estadeo.info/. Enter Z232 in the search box to learn more about \"DASH Diet: Care Instructions. \" Current as of: September 21, 2016 Content Version: 11.4 © 4527-9682 Healthwise, [employer]. Care instructions adapted under license by [employer] (which disclaims liability or warranty for this information). If you have questions about a medical condition or this instruction, always ask your healthcare professional. [place] [address] any warranty or liability for your use of this information. Introducing [address]! [facility] introduces [employer] patient portal. Now you can access parts of your medical record, email your doctor's office, and request medication refills online. 1. In your internet browser, go to [email or web address] 2. Click on the First Time User? Click Here link in the Sign In box. You will see the New Member Sign Up page. 3. Enter your [employer] Access Code exactly as it appears below.  You will not need to use this code after youve completed the sign-up process. If you do not sign up before the expiration date, you must request a new code. · [employer] Access Code: [email or web address] Expires: 10/3/2018  2:02 PM 
 
4. Enter the last four digits of your Social Security Number (xxxx) and Date of Birth (mm/dd/yyyy) as indicated and click Submit. You will be taken to the next sign-up page. 5. Create a [employer] ID. This will be your [employer] login ID and cannot be changed, so think of one that is secure and easy to remember. 6. Create a [employer] password. You can change your password at any time. 7. Enter your Password Reset Question and Answer. This can be used at a later time if you forget your password. 8. Enter your e-mail address. You will receive e-mail notification when new information is available in [address] 9. Click Sign Up. You can now view and download portions of your medical record. 10. Click the Download Summary menu link to download a portable copy of your medical information. If you have questions, please visit the Frequently Asked Questions section of the [employer] website. Remember, [employer] is NOT to be used for urgent needs. For medical emergencies, dial 911. Now available from your iPhone and Android! Please provide this summary of care documentation to your next provider. Your primary care clinician is listed as Leonel Goins. If you have any questions after today's visit, please call [phone].

## 2018-07-06 NOTE — PROGRESS NOTES
Progress Note      Patient: Valarie Moffett               Sex: male          DOA: 7/2/2018       YOB: 1980      Age:  34 y.o.        LOS:  LOS: 3 days               Subjective:   Valarie Moffett is a 34 y.o. male  who presents with palpations and dizziness. He was admitted for symptomatic bradycardia. He has hx of hypertension , anxiety and Opioid addiction. He takes propranolol for HTN and doxepin for anxiety. These medications are on hold. Today his HR is improved. Cardiology consulting. His propranolol was discontinued and he is now on HCTZ for HTN control. ECHO is pending. He reports that he feels like he is not getting enough circulation to his head when he closes his eye. Otherwise no other complaints. Objective:      Visit Vitals    BP (!) 150/95    Pulse 81    Temp 98 °F (36.7 °C)    Resp 16    Ht 5' 9\" (1.753 m)    Wt 137.2 kg (302 lb 8 oz)    SpO2 97%    BMI 44.67 kg/m2           Physical Exam:  General:  alert, cooperative, no distress, appears stated age  Neurologic:  oriented  Lungs:  clear to auscultation bilaterally  Heart:  regular rate and rhythm, S1, S2 normal, no murmur, click, rub or gallop  Abdomen:  soft, non-tender. Bowel sounds normal. No masses,  no organomegaly  Cardiovascular:  Regular rate and rhythm, S1S2 present, without murmur or extra heart sounds, pedal pulses normal and no edema  Skin:  Normal.    Intake and Output:  Current Shift:  07/06 0701 - 07/06 1900  In: 5592 [P.O.:1280; I.V.:1190]  Out: 500 [Urine:500]  Last three shifts:  07/04 1901 - 07/06 0700  In: 949.2 [P.O.:360; I.V.:589.2]  Out: -       Lab/Data Reviewed: All lab results for the last 24 hours reviewed. XRays were reviewed in past 24 hours    Medications Reviewed      Continued hospitalization is indicated due to Bradycardia .       Assessment/Plan     Active Problems:    Bradycardia (7/2/2018)      HTN (hypertension) (7/3/2018)      Anxiety (7/3/2018)      Bradycardia - improving   - propranolol discontinued per cardiology recommendation   - TSH wnl  - ECHO EF 55%. Left ventricular mild concentric hypertrophy observed. Age-appropriate left ventricular diastolic function. - cardiology following.  Appreciated     HTN  - Switched from propranolol to HCTZ  - Monitor BP closely    Hx Anxiety   - Doxepin PTA     HX Opioid addiction   - will confirm home medication and restart     DVT prophylaxis     Full code     Disposition : Home x 1 day         Grant Davila MD  July 6, 2018

## 2018-07-10 NOTE — ANCILLARY DISCHARGE INSTRUCTIONS
[facility] [address]  Discharge Phone Call       After-Care Discharge Phone Call Questions:    Were you able to get your prescriptions filled? Comment:      [x] Yes  []No    Comment if answer is \"No\"   Are you taking your medication(s) as your doctor ordered? Do you understand the purpose of your medications? Comment:    [x] Yes  []No    Comment if answer is \"No\"   Are you taking any other medications that are not on the list?  Comment:      [] Yes  [x]No    Comment if answer is \"Yes\"   Do you have any questions about your medications? No  Are you aware of potential side effects? Yes   Comment:    [x] Yes  [x]No    Comment if answer is \"Yes\"   Did you make your follow-up appointments (if the hospital did not do this before  discharge)? Comment:    [x] Yes  []No    Comment if answer is \"No\"   Is there any reason you might not be able to keep your follow-up appointments? Comment:     [] Yes  [x]No    Comment if answer is \"Yes\"   Do you have any questions about your care plan? No Are you aware of what health problems to be alert for? Yes   Comment:    [x] Yes  [x]No    Comment if answer is \"Yes\"   Do you have a good understanding of how you should manage your health? Comment:    [x] Yes  []No    Comment if answer is \"Yes\"   Do you know which symptoms to watch for that would mean you would need to call your doctor right away? Comment:      [x] Yes  []No    Comment if answer is \"No\"   Do you have any questions about the follow up process or any instructions that we have provided? Comment:    [] Yes  [x]No    Comment if answer is \"Yes\"   Did staff take your preferences into account?         [x] Yes  []No    Comment if answer is \"Yes\"

## 2018-08-06 ENCOUNTER — OFFICE VISIT (OUTPATIENT)
Dept: INTERNAL MEDICINE CLINIC | Age: 38
End: 2018-08-06

## 2018-08-06 VITALS
RESPIRATION RATE: 16 BRPM | OXYGEN SATURATION: 97 % | BODY MASS INDEX: 42 KG/M2 | HEIGHT: 69 IN | TEMPERATURE: 97.5 F | DIASTOLIC BLOOD PRESSURE: 96 MMHG | HEART RATE: 73 BPM | SYSTOLIC BLOOD PRESSURE: 139 MMHG | WEIGHT: 283.6 LBS

## 2018-08-06 DIAGNOSIS — F41.9 ANXIETY: ICD-10-CM

## 2018-08-06 DIAGNOSIS — I10 ESSENTIAL HYPERTENSION: Primary | Chronic | ICD-10-CM

## 2018-08-06 RX ORDER — SERTRALINE HYDROCHLORIDE 100 MG/1
100 TABLET, FILM COATED ORAL DAILY
Qty: 90 TAB | Refills: 3 | Status: SHIPPED | OUTPATIENT
Start: 2018-08-06 | End: 2019-09-24 | Stop reason: SDUPTHER

## 2018-08-06 RX ORDER — HYDROCHLOROTHIAZIDE 25 MG/1
25 TABLET ORAL DAILY
Qty: 90 TAB | Refills: 3 | Status: SHIPPED | OUTPATIENT
Start: 2018-08-06 | End: 2019-09-24 | Stop reason: SDUPTHER

## 2018-08-06 RX ORDER — LISINOPRIL 10 MG/1
10 TABLET ORAL DAILY
Qty: 90 TAB | Refills: 3 | Status: SHIPPED | OUTPATIENT
Start: 2018-08-06 | End: 2019-09-24 | Stop reason: SDUPTHER

## 2018-08-06 NOTE — PROGRESS NOTES
ROOM # 16  Identified pt with two pt identifiers(name and ). Reviewed record in preparation for visit and have obtained necessary documentation. Chief Complaint   Patient presents with    Hypertension      Sarah Paiz preferred language for health care discussion is english/other. Is the patient using any DME equipment during OV? Neisha Reed is due for:  Health Maintenance Due   Topic    DTaP/Tdap/Td series (1 - Tdap)    Influenza Age 5 to Adult      Health Maintenance reviewed and discussed per provider  Please order/place referral if appropriate. Advance Directive:  1. Do you have an advance directive in place? Patient Reply: NO    2. If not, would you like material regarding how to put one in place? NO    Coordination of Care:  1. Have you been to the ER, urgent care clinic since your last visit? Hospitalized since your last visit? NO    2. Have you seen or consulted any other health care providers outside of the [address] [place] [address] since your last visit? Include any pap smears or colon screening. YES    Patient is accompanied by self I have received verbal consent from Sarah Paiz to discuss any/all medical information while they are present in the room. Learning Assessment:  No flowsheet data found. Depression Screening:  PHQ over the last two weeks 2018   Little interest or pleasure in doing things Not at all   Feeling down, depressed, irritable, or hopeless Not at all   Total Score PHQ 2 0     Abuse Screening:  No flowsheet data found. Fall Risk  No flowsheet data found.

## 2018-08-06 NOTE — PATIENT INSTRUCTIONS

## 2018-08-06 NOTE — PROGRESS NOTES
HISTORY OF PRESENT ILLNESS  Kev Yang is a 34 y.o. male. HPI Comments: 33 yo male here for f/u of HTN, anxiety. Tolerating HCTZ but BP remains a little elevated. Has lost 2 lbs since last visit. Continues to feel anxious. Zoloft titrated to 100mg. Is seeing mental health counselor regularly. Anxiety worse in the morning but he does feel Zoloft is helping. Review of Systems   Constitutional: Negative for chills, fever and weight loss. HENT: Negative for congestion and ear pain. Eyes: Negative for blurred vision and pain. Respiratory: Negative for cough and shortness of breath. Cardiovascular: Negative for chest pain, palpitations and leg swelling. Gastrointestinal: Negative for nausea and vomiting. Genitourinary: Negative for frequency and urgency. Musculoskeletal: Negative for joint pain and myalgias. Skin: Negative for itching and rash. Neurological: Negative for dizziness, tingling and headaches. Psychiatric/Behavioral: Negative for depression. The patient is nervous/anxious. Past Medical History:   Diagnosis Date    Anxiety     Hypertension     Obesity      Current Outpatient Prescriptions on File Prior to Visit   Medication Sig Dispense Refill    sertraline (ZOLOFT) 50 mg tablet Take 0.5 Tabs by mouth daily. May increase to 1 tab daily in 1 week  Indications: ANXIETY WITH DEPRESSION 60 Tab 0    buprenorphine-naloxone 8-2 mg subl 2 Tabs by SubLINGual route daily. Indications: OPIOID WITHDRAWAL SYMPTOMS      [] hydroCHLOROthiazide (HYDRODIURIL) 25 mg tablet Take 1 Tab by mouth daily for 30 days. 30 Tab 0     No current facility-administered medications on file prior to visit. Physical Exam   Constitutional: He appears well-developed and well-nourished. No distress.    BP (!) 139/96 (BP 1 Location: Left arm, BP Patient Position: Sitting)  Pulse 73  Temp 97.5 °F (36.4 °C) (Oral)   Resp 16  Ht 5' 9\" (1.753 m)  Wt 283 lb 9.6 oz (128.6 kg)  SpO2 97% BMI 41.88 kg/m2     Eyes: EOM are normal. Right eye exhibits no discharge. Left eye exhibits no discharge. No scleral icterus. Neck: Neck supple. Cardiovascular: Normal rate, regular rhythm and normal heart sounds. Exam reveals no gallop and no friction rub. No murmur heard. Pulmonary/Chest: Effort normal and breath sounds normal. No respiratory distress. He has no wheezes. He has no rales. Musculoskeletal: He exhibits no edema or tenderness. Lymphadenopathy:     He has no cervical adenopathy. Neurological: He is alert. He exhibits normal muscle tone. Skin: Skin is warm and dry. Psychiatric: He has a normal mood and affect. Lab Results   Component Value Date/Time    Sodium 139 07/05/2018 04:26 AM    Potassium 3.8 07/05/2018 04:26 AM    Chloride 104 07/05/2018 04:26 AM    CO2 28 07/05/2018 04:26 AM    Anion gap 7 07/05/2018 04:26 AM    Glucose 78 07/05/2018 04:26 AM    BUN 13 07/05/2018 04:26 AM    Creatinine 1.00 07/05/2018 04:26 AM    BUN/Creatinine ratio 13 07/05/2018 04:26 AM    GFR est AA >60 07/05/2018 04:26 AM    GFR est non-AA >60 07/05/2018 04:26 AM    Calcium 8.7 07/05/2018 04:26 AM    Bilirubin, total 0.5 07/02/2018 12:22 PM    AST (SGOT) 13 (L) 07/02/2018 12:22 PM    Alk. phosphatase 115 07/02/2018 12:22 PM    Protein, total 6.9 07/02/2018 12:22 PM    Albumin 3.5 07/02/2018 12:22 PM    Globulin 3.4 07/02/2018 12:22 PM    A-G Ratio 1.0 07/02/2018 12:22 PM    ALT (SGPT) 22 07/02/2018 12:22 PM     ASSESSMENT and PLAN    ICD-10-CM ICD-9-CM    1. Essential hypertension I10 401.9    2. Anxiety F41.9 300.00      Continue HCTZ and will add lisinopril 10 mg daily. Continue Zoloft at 100mg dose for now but can continue to titrate.    F/u with mental health as planned

## 2018-08-06 NOTE — MR AVS SNAPSHOT
[facility] 
 
 
 [address] [facility] [address] [address] 
[phone] Patient: Rae Langley MRN: [id number]:[id number] Visit Information Date & Time Provider Department Dept. Phone Encounter #  
 8/6/2018  7:15 AM Shanita Salas, [address] [phone] [id number] Follow-up Instructions Return in about 3 months (around 11/6/2018) for hypertension. Upcoming Health Maintenance Date Due DTaP/Tdap/Td series (1 - Tdap) 3/18/1999 Influenza Age 5 to Adult 8/1/2018 Allergies as of 8/6/2018  Review Complete On: 8/6/2018 By: Cong Jackson No Known Allergies Current Immunizations  Never Reviewed No immunizations on file. Not reviewed this visit You Were Diagnosed With   
  
 Codes Comments Essential hypertension    -  Primary ICD-10-CM: I10 
ICD-9-CM: 401.9 Anxiety     ICD-10-CM: F41.9 ICD-9-CM: 300.00 Vitals BP Pulse Temp Resp Height(growth percentile) Weight(growth percentile) (!) 139/96 (BP 1 Location: Left arm, BP Patient Position: Sitting) 73 97.5 °F (36.4 °C) (Oral) 16 5' 9\" (1.753 m) 283 lb 9.6 oz (128.6 kg) SpO2 BMI Smoking Status 97% 41.88 kg/m2 Former Smoker Vitals History BMI and BSA Data Body Mass Index Body Surface Area  
 41.88 kg/m 2 2.5 m 2 Preferred Pharmacy Pharmacy Name Phone [address], [address] [phone] Your Updated Medication List  
  
   
This list is accurate as of 8/6/18  7:29 AM.  Always use your most recent med list.  
  
  
  
  
 buprenorphine-naloxone 8-2 mg Subl 2 Tabs by SubLINGual route daily. Indications: OPIOID WITHDRAWAL SYMPTOMS  
  
 hydroCHLOROthiazide 25 mg tablet Commonly known as:  HYDRODIURIL Take 1 Tab by mouth daily. lisinopril 10 mg tablet Commonly known as:  Thersichai Stephenson  
 Take 1 Tab by mouth daily. Indications: hypertension  
  
 sertraline 100 mg tablet Commonly known as:  ZOLOFT Take 1 Tab by mouth daily. Prescriptions Sent to Pharmacy Refills  
 hydroCHLOROthiazide (HYDRODIURIL) 25 mg tablet 3 Sig: Take 1 Tab by mouth daily. Class: Normal  
 Pharmacy: [facility] DR JONE CANCHOLA [address], [address] Ph #: [phone] Route: Oral  
 lisinopril (PRINIVIL, ZESTRIL) 10 mg tablet 3 Sig: Take 1 Tab by mouth daily. Indications: hypertension Class: Normal  
 Pharmacy: [facility] DR JONE CANHCOLA [address], [address] Ph #: [phone] Route: Oral  
 sertraline (ZOLOFT) 100 mg tablet 3 Sig: Take 1 Tab by mouth daily. Class: Normal  
 Pharmacy: [facility] DR JONE CANCHOLA [address], [address] Ph #: [phone] Route: Oral  
  
Follow-up Instructions Return in about 3 months (around 11/6/2018) for hypertension. Patient Instructions Low Sodium Diet (2,000 Milligram): Care Instructions Your Care Instructions Too much sodium causes your body to hold on to extra water. This can raise your blood pressure and force your heart and kidneys to work harder. In very serious cases, this could cause you to be put in the hospital. It might even be life-threatening. By limiting sodium, you will feel better and lower your risk of serious problems. The most common source of sodium is salt. People get most of the salt in their diet from canned, prepared, and packaged foods. Fast food and restaurant meals also are very high in sodium. Your doctor will probably limit your sodium to less than 2,000 milligrams (mg) a day. This limit counts all the sodium in prepared and packaged foods and any salt you add to your food. Follow-up care is a key part of your treatment and safety.  Be sure to make and go to all appointments, and call your doctor if you are having problems. It's also a good idea to know your test results and keep a list of the medicines you take. How can you care for yourself at home? Read food labels · Read labels on cans and food packages. The labels tell you how much sodium is in each serving. Make sure that you look at the serving size. If you eat more than the serving size, you have eaten more sodium. · Food labels also tell you the Percent Daily Value for sodium. Choose products with low Percent Daily Values for sodium. · Be aware that sodium can come in forms other than salt, including monosodium glutamate (MSG), sodium citrate, and sodium bicarbonate (baking soda). MSG is often added to Asian food. When you eat out, you can sometimes ask for food without MSG or added salt. Buy low-sodium foods · Buy foods that are labeled \"unsalted\" (no salt added), \"sodium-free\" (less than 5 mg of sodium per serving), or \"low-sodium\" (less than 140 mg of sodium per serving). Foods labeled \"reduced-sodium\" and \"light sodium\" may still have too much sodium. Be sure to read the label to see how much sodium you are getting. · Buy fresh vegetables, or frozen vegetables without added sauces. Buy low-sodium versions of canned vegetables, soups, and other canned goods. Prepare low-sodium meals · Cut back on the amount of salt you use in cooking. This will help you adjust to the taste. Do not add salt after cooking. One teaspoon of salt has about 2,300 mg of sodium. · Take the salt shaker off the table. · Flavor your food with garlic, lemon juice, onion, vinegar, herbs, and spices. Do not use soy sauce, lite soy sauce, steak sauce, onion salt, garlic salt, celery salt, mustard, or ketchup on your food. · Use low-sodium salad dressings, sauces, and ketchup. Or make your own salad dressings and sauces without adding salt. · Use less salt (or none) when recipes call for it.  You can often use half the salt a recipe calls for without losing flavor. Other foods such as rice, pasta, and grains do not need added salt. · Rinse canned vegetables, and cook them in fresh water. This removes some-but not all-of the salt. · Avoid water that is naturally high in sodium or that has been treated with water softeners, which add sodium. Call your local water company to find out the sodium content of your water supply. If you buy bottled water, read the label and choose a sodium-free brand. Avoid high-sodium foods · Avoid eating: ¨ Smoked, cured, salted, and canned meat, fish, and poultry. ¨ Ham, muñiz, hot dogs, and luncheon meats. ¨ Regular, hard, and processed cheese and regular peanut butter. ¨ Crackers with salted tops, and other salted snack foods such as pretzels, chips, and salted popcorn. ¨ Frozen prepared meals, unless labeled low-sodium. ¨ Canned and dried soups, broths, and bouillon, unless labeled sodium-free or low-sodium. ¨ Canned vegetables, unless labeled sodium-free or low-sodium. ¨ Western Sondra fries, pizza, tacos, and other fast foods. ¨ Pickles, olives, ketchup, and other condiments, especially soy sauce, unless labeled sodium-free or low-sodium. Where can you learn more? Go to http://jaleel-tadeo.info/. Enter E330 in the search box to learn more about \"Low Sodium Diet (2,000 Milligram): Care Instructions. \" Current as of: May 12, 2017 Content Version: 11.7 © 3156-5187 [employer]. Care instructions adapted under license by [employer] (which disclaims liability or warranty for this information). If you have questions about a medical condition or this instruction, always ask your healthcare professional. [address] any warranty or liability for your use of this information. Introducing [facility]!    
 [facility] introduces [employer] patient portal. Now you can access parts of your medical record, email your doctor's office, and request medication refills online. 1. In your internet browser, go to [email or web address]. [email or web address] 2. Click on the First Time User? Click Here link in the Sign In box. You will see the New Member Sign Up page. 3. Enter your KnowledgeMill Access Code exactly as it appears below. You will not need to use this code after youve completed the sign-up process. If you do not sign up before the expiration date, you must request a new code. · KnowledgeMill Access Code: [email or web address] Expires: 10/3/2018  2:02 PM 
 
4. Enter the last four digits of your Social Security Number (xxxx) and Date of Birth (mm/dd/yyyy) as indicated and click Submit. You will be taken to the next sign-up page. 5. Create a KnowledgeMill ID. This will be your KnowledgeMill login ID and cannot be changed, so think of one that is secure and easy to remember. 6. Create a KnowledgeMill password. You can change your password at any time. 7. Enter your Password Reset Question and Answer. This can be used at a later time if you forget your password. 8. Enter your e-mail address. You will receive e-mail notification when new information is available in [address] 9. Click Sign Up. You can now view and download portions of your medical record. 10. Click the Download Summary menu link to download a portable copy of your medical information. If you have questions, please visit the Frequently Asked Questions section of the [employer]Mill website. Remember, KnowledgeMill is NOT to be used for urgent needs. For medical emergencies, dial 911. Now available from your iPhone and Android! Please provide this summary of care documentation to your next provider. Your primary care clinician is listed as Leonel Rosenthal [address] If you have any questions after today's visit, please call [phone]. progress slowly

## 2018-11-05 ENCOUNTER — DOCUMENTATION ONLY (OUTPATIENT)
Dept: INTERNAL MEDICINE CLINIC | Age: 38
End: 2018-11-05

## 2018-11-05 NOTE — PROGRESS NOTES
Pharmacy Note: Immunization Update    Patient: Lina Paredes (48 y.o., 1980)     Patient's immunization history was updated to reflect information contained in the [place] and/or outside immunization/pharmacy records were reconciled within [facility]. Health Maintenance schedule updated when appropriate.     Current immunizations now reflect:       Immunization History   Administered Date(s) Administered    Influenza Vaccine 07/24/2017       Anay Crews, AmilcarD, BCACP

## 2019-09-02 RX ORDER — SERTRALINE HYDROCHLORIDE 100 MG/1
TABLET, FILM COATED ORAL
Qty: 30 TAB | Refills: 11 | OUTPATIENT
Start: 2019-09-02

## 2019-09-02 NOTE — TELEPHONE ENCOUNTER
Denied:    Requested Prescriptions     Refused Prescriptions Disp Refills    sertraline (ZOLOFT) 100 mg tablet [Pharmacy Med Name: SERTRALINE 100MG TAB] 30 Tab 11     Sig: TAKE 1 TABLET BY MOUTH ONCE DAILY     Refused By: Carlos Gist     Reason for Refusal: Appt required, please call patient     Pt last seen on 8/26/18. Needs OV to establish care with new PCP.

## 2019-09-03 NOTE — TELEPHONE ENCOUNTER
Attempted to contact patient at  number, no answer. No voicemail. Will continue to try to contact patient.

## 2019-09-09 NOTE — TELEPHONE ENCOUNTER
Attempted to contact patient at  number, no answer. Patient home number is not in service. Patient mobile number voice mail is not set up. Patient has an appointment to establish care with AMA on 09/24/2019.

## 2019-09-24 ENCOUNTER — OFFICE VISIT (OUTPATIENT)
Dept: FAMILY MEDICINE CLINIC | Age: 39
End: 2019-09-24

## 2019-09-24 ENCOUNTER — HOSPITAL ENCOUNTER (OUTPATIENT)
Dept: LAB | Age: 39
Discharge: HOME | End: 2019-09-24
Payer: MEDICAID

## 2019-09-24 VITALS
BODY MASS INDEX: 41.47 KG/M2 | TEMPERATURE: 96.9 F | OXYGEN SATURATION: 96 % | RESPIRATION RATE: 18 BRPM | HEART RATE: 70 BPM | SYSTOLIC BLOOD PRESSURE: 154 MMHG | HEIGHT: 69 IN | DIASTOLIC BLOOD PRESSURE: 96 MMHG | WEIGHT: 280 LBS

## 2019-09-24 DIAGNOSIS — R45.89 DEPRESSED MOOD: ICD-10-CM

## 2019-09-24 DIAGNOSIS — H53.8 BLURRED VISION: ICD-10-CM

## 2019-09-24 DIAGNOSIS — E66.01 OBESITY, MORBID (HCC): ICD-10-CM

## 2019-09-24 DIAGNOSIS — R35.89 POLYURIA: ICD-10-CM

## 2019-09-24 DIAGNOSIS — Z00.00 ANNUAL PHYSICAL EXAM: Primary | ICD-10-CM

## 2019-09-24 DIAGNOSIS — R21 RASH AND NONSPECIFIC SKIN ERUPTION: ICD-10-CM

## 2019-09-24 DIAGNOSIS — I10 ESSENTIAL HYPERTENSION: Chronic | ICD-10-CM

## 2019-09-24 DIAGNOSIS — F41.9 ANXIETY: ICD-10-CM

## 2019-09-24 PROBLEM — R00.1 BRADYCARDIA: Status: RESOLVED | Noted: 2018-07-02 | Resolved: 2019-09-24

## 2019-09-24 LAB
ALBUMIN SERPL-MCNC: 3.7 G/DL (ref 3.4–5)
ALBUMIN/GLOB SERPL: 1.2 {RATIO} (ref 0.8–1.7)
ALP SERPL-CCNC: 103 U/L (ref 45–117)
ALT SERPL-CCNC: 28 U/L (ref 16–61)
ANION GAP SERPL CALC-SCNC: 6 MMOL/L (ref 3–18)
AST SERPL-CCNC: 17 U/L (ref 10–38)
BILIRUB SERPL-MCNC: 0.4 MG/DL (ref 0.2–1)
BUN SERPL-MCNC: 12 MG/DL (ref 7–18)
BUN/CREAT SERPL: 13 (ref 12–20)
CALCIUM SERPL-MCNC: 8.5 MG/DL (ref 8.5–10.1)
CHLORIDE SERPL-SCNC: 109 MMOL/L (ref 100–111)
CO2 SERPL-SCNC: 27 MMOL/L (ref 21–32)
CREAT SERPL-MCNC: 0.9 MG/DL (ref 0.6–1.3)
ERYTHROCYTE [DISTWIDTH] IN BLOOD BY AUTOMATED COUNT: 14.4 % (ref 11.6–14.5)
EST. AVERAGE GLUCOSE BLD GHB EST-MCNC: 100 MG/DL
GLOBULIN SER CALC-MCNC: 3.1 G/DL (ref 2–4)
GLUCOSE SERPL-MCNC: 73 MG/DL (ref 74–99)
HBA1C MFR BLD: 5.1 % (ref 4.2–5.6)
HCT VFR BLD AUTO: 44.7 % (ref 36–48)
HGB BLD-MCNC: 14.7 G/DL (ref 13–16)
MCH RBC QN AUTO: 29.3 PG (ref 24–34)
MCHC RBC AUTO-ENTMCNC: 32.9 G/DL (ref 31–37)
MCV RBC AUTO: 89.2 FL (ref 74–97)
PLATELET # BLD AUTO: 210 K/UL (ref 135–420)
PMV BLD AUTO: 10.9 FL (ref 9.2–11.8)
POTASSIUM SERPL-SCNC: 3.9 MMOL/L (ref 3.5–5.5)
PROT SERPL-MCNC: 6.8 G/DL (ref 6.4–8.2)
RBC # BLD AUTO: 5.01 M/UL (ref 4.7–5.5)
SODIUM SERPL-SCNC: 142 MMOL/L (ref 136–145)
WBC # BLD AUTO: 9.9 K/UL (ref 4.6–13.2)

## 2019-09-24 PROCEDURE — 83036 HEMOGLOBIN GLYCOSYLATED A1C: CPT

## 2019-09-24 PROCEDURE — 80061 LIPID PANEL: CPT

## 2019-09-24 PROCEDURE — 85027 COMPLETE CBC AUTOMATED: CPT

## 2019-09-24 PROCEDURE — 80053 COMPREHEN METABOLIC PANEL: CPT

## 2019-09-24 PROCEDURE — 36415 COLL VENOUS BLD VENIPUNCTURE: CPT

## 2019-09-24 RX ORDER — HYDROCHLOROTHIAZIDE 25 MG/1
25 TABLET ORAL DAILY
Qty: 90 TAB | Refills: 1 | Status: SHIPPED | OUTPATIENT
Start: 2019-09-24

## 2019-09-24 RX ORDER — LISINOPRIL 10 MG/1
10 TABLET ORAL DAILY
Qty: 90 TAB | Refills: 1 | Status: SHIPPED | OUTPATIENT
Start: 2019-09-24

## 2019-09-24 RX ORDER — TRIAMCINOLONE ACETONIDE 1 MG/G
CREAM TOPICAL 2 TIMES DAILY
Qty: 15 G | Refills: 0 | Status: SHIPPED | OUTPATIENT
Start: 2019-09-24

## 2019-09-24 RX ORDER — SERTRALINE HYDROCHLORIDE 100 MG/1
200 TABLET, FILM COATED ORAL DAILY
Qty: 180 TAB | Refills: 3 | Status: SHIPPED | OUTPATIENT
Start: 2019-09-24

## 2019-09-24 NOTE — PROGRESS NOTES
[address] [place] [address]               [phone]      Bladimir Goldberg is a 37 y.o. male and presents with Hypertension; Anxiety; and Establish Care       Assessment/Plan:    Diagnoses and all orders for this visit:    1. Annual physical exam  Annual physical exam performed, labs ordered as needed    2. Essential hypertension  -     hydroCHLOROthiazide (HYDRODIURIL) 25 mg tablet; Take 1 Tab by mouth daily. -     lisinopril (PRINIVIL, ZESTRIL) 10 mg tablet; Take 1 Tab by mouth daily. Indications: high blood pressure  -     CBC W/O DIFF; Future  -     METABOLIC PANEL, COMPREHENSIVE; Future  -     LIPID PANEL; Future  Blood pressure slightly elevated at today's visit 154/96  Continue same medications for now as he is a little nervous at this visit  Routine labs ordered    3. Obesity, morbid ([facility] [address]  Discussed the patient's BMI. The BMI follow up plan is as follows:     dietary management education, guidance, and counseling  encourage exercise  monitor weight  prescribed dietary intake  Discussed portion control  Eat fresh or frozen fruits and vegetables, stay away from canned  Limit eating out and fast food  Practice meal planning    4. Anxiety  -     sertraline (ZOLOFT) 100 mg tablet; Take 2 Tabs by mouth daily. Her previous providers notes he is being followed by a counselor  Continue Zoloft as he states he is getting some relief with it    5. Depressed mood  -     sertraline (ZOLOFT) 100 mg tablet; Take 2 Tabs by mouth daily. 6. Rash and nonspecific skin eruption  -     triamcinolone acetonide (KENALOG) 0.1 % topical cream; Apply  to affected area two (2) times a day. use thin layer  Rash to bilateral upper extremities, will treat with Kenalog and monitor the effects    7. Blurred vision  -     HEMOGLOBIN A1C WITH EAG; Future  Endorses several symptoms that could be consistent with diabetes, will check A1c today    8.  Polyuria  -     HEMOGLOBIN A1C WITH EAG; Future    This note was dictated using Dragon dictation system. Every effort was made to ensure accuracy, although occasional spelling errors and word substitutions are expected due to dictation system limitations. Thank you for your understanding and patient. Follow-up and Dispositions    · Return in about 4 weeks (around 10/22/2019) for hypertension, 15 minutes. Subjective:    Here today to establish care and manage acute and chronic medical conditions. Specifically concerned about his high blood pressure and problems with anxiety    ROS:     Review of Systems   Constitutional: Negative. Eyes: Positive for blurred vision. Left eye: blind   Respiratory: Negative. Cardiovascular: Negative. Gastrointestinal: Negative. Genitourinary: Positive for frequency. Urinates every couple of hours, has to get up at night to urinate  Has been going on for about a year   Musculoskeletal: Negative. Skin:        Itchy, round sores to upper ext bilat  Onset a few months ago  Characteristics: started out itching, now do not itch as much,  Sores are healing, no more new sores breaking out  Treatments: cleaned with peroxide, triple antibiotic cream  This helped some   Neurological: Negative. Endo/Heme/Allergies: Negative for polydipsia. Psychiatric/Behavioral: Positive for depression. Negative for hallucinations, substance abuse and suicidal ideas. The patient is nervous/anxious and has insomnia. Dx: depression  Insomnia: sleeps 3-4 hours a night, zoloft helps       The problem list was updated as a part of today's visit. Patient Active Problem List   Diagnosis Code    Hypertension I10    Anxiety F41.9    Obesity, morbid ([facility] [address] E66.01    Depressed mood F32.9       The PSH, FH were reviewed.       SH:  Social History     Tobacco Use    Smoking status: Former Smoker     Types: Cigarettes     Last attempt to quit: 2018     Years since quittin.2    Smokeless tobacco: Never Used   Substance Use Topics    Alcohol use: No     Comment: h/o alcohol dependence    Drug use: No       Medications/Allergies:  No current outpatient medications on file prior to visit. No current facility-administered medications on file prior to visit. No Known Allergies    Objective:  Visit Vitals  BP (!) 154/96 (BP 1 Location: Left arm, BP Patient Position: Sitting)   Pulse 70   Temp 96.9 °F (36.1 °C) (Oral)   Resp 18   Ht 5' 9\" (1.753 m)   Wt 280 lb (127 kg)   SpO2 96%   BMI 41.35 kg/m²    Body mass index is 41.35 kg/m². Physical assessment  Physical Exam   Constitutional: He is oriented to person, place, and time and well-developed, well-nourished, and in no distress. Vital signs are normal.   HENT:   Head: Normocephalic and atraumatic. Right Ear: Hearing, tympanic membrane, external ear and ear canal normal.   Left Ear: Hearing, tympanic membrane, external ear and ear canal normal.   Nose: Nose normal.   Mouth/Throat: Uvula is midline, oropharynx is clear and moist and mucous membranes are normal.   Eyes: Pupils are equal, round, and reactive to light. Conjunctivae, EOM and lids are normal.   Neck: Trachea normal and normal range of motion. Neck supple. No JVD present. Carotid bruit is not present. No tracheal deviation present. No thyroid mass and no thyromegaly present. Cardiovascular: Normal rate, regular rhythm, S1 normal, S2 normal, normal heart sounds and intact distal pulses. Pulmonary/Chest: Effort normal and breath sounds normal.   Abdominal: Soft. Normal appearance and bowel sounds are normal. There is no tenderness. There is no CVA tenderness. Musculoskeletal: Normal range of motion. Right lower leg: He exhibits edema. Left lower leg: He exhibits edema. Legs:  Lymphadenopathy:        Head (right side): No submental, no submandibular, no tonsillar, no preauricular, no posterior auricular and no occipital adenopathy present. Head (left side):  No submental, no submandibular, no tonsillar, no preauricular, no posterior auricular and no occipital adenopathy present. He has no cervical adenopathy. Neurological: He is alert and oriented to person, place, and time. He has normal motor skills. Gait normal.   Skin: Skin is warm and dry. Lesion noted. Psychiatric: Mood, memory, affect and judgment normal.   Nursing note and vitals reviewed. Labwork and Ancillary Studies:    CBC w/Diff  Lab Results   Component Value Date/Time    WBC 9.9 09/24/2019 01:53 PM    HGB 14.7 09/24/2019 01:53 PM    PLATELET 292 07/39/8506 01:53 PM         Basic Metabolic Profile  Lab Results   Component Value Date/Time    Sodium 142 09/24/2019 01:53 PM    Potassium 3.9 09/24/2019 01:53 PM    Chloride 109 09/24/2019 01:53 PM    CO2 27 09/24/2019 01:53 PM    Anion gap 6 09/24/2019 01:53 PM    Glucose 73 (L) 09/24/2019 01:53 PM    BUN 12 09/24/2019 01:53 PM    Creatinine 0.90 09/24/2019 01:53 PM    BUN/Creatinine ratio 13 09/24/2019 01:53 PM    GFR est AA >60 09/24/2019 01:53 PM    GFR est non-AA >60 09/24/2019 01:53 PM    Calcium 8.5 09/24/2019 01:53 PM        Cholesterol  Lab Results   Component Value Date/Time    Cholesterol, total 215 (H) 09/24/2019 01:53 PM    HDL Cholesterol 34 (L) 09/24/2019 01:53 PM    LDL, calculated 153 (H) 09/24/2019 01:53 PM    Triglyceride 140 09/24/2019 01:53 PM    CHOL/HDL Ratio 6.3 (H) 09/24/2019 01:53 PM       Health Maintenance:   Health Maintenance   Topic Date Due    DTaP/Tdap/Td series (1 - Tdap) 03/18/1999    Influenza Age 5 to Adult  08/01/2019    Pneumococcal 0-64 years  Aged Out       I have discussed the diagnosis with the patient and the intended plan as seen in the above orders. The patient has received an After-Visit Summary and questions were answered concerning future plans. An After Visit Summary was printed and given to the patient.     All diagnosis have been discussed with the patient and all of the patient's questions have been answered. Follow-up and Dispositions    · Return in about 4 weeks (around 10/22/2019) for hypertension, 15 minutes. Hattie Kanner, [facility]  [address] [facility]   [address] [facility] [address]  [address]

## 2019-09-24 NOTE — PATIENT INSTRUCTIONS
Body Mass Index: Care Instructions Your Care Instructions Body mass index (BMI) can help you see if your weight is raising your risk for health problems. It uses a formula to compare how much you weigh with how tall you are. · A BMI lower than 18.5 is considered underweight. · A BMI between 18.5 and 24.9 is considered healthy. · A BMI between 25 and 29.9 is considered overweight. A BMI of 30 or higher is considered obese. If your BMI is in the normal range, it means that you have a lower risk for weight-related health problems. If your BMI is in the overweight or obese range, you may be at increased risk for weight-related health problems, such as high blood pressure, heart disease, stroke, arthritis or joint pain, and diabetes. If your BMI is in the underweight range, you may be at increased risk for health problems such as fatigue, lower protection (immunity) against illness, muscle loss, bone loss, hair loss, and hormone problems. BMI is just one measure of your risk for weight-related health problems. You may be at higher risk for health problems if you are not active, you eat an unhealthy diet, or you drink too much alcohol or use tobacco products. Follow-up care is a key part of your treatment and safety. Be sure to make and go to all appointments, and call your doctor if you are having problems. It's also a good idea to know your test results and keep a list of the medicines you take. How can you care for yourself at home? · Practice healthy eating habits. This includes eating plenty of fruits, vegetables, whole grains, lean protein, and low-fat dairy. · If your doctor recommends it, get more exercise. Walking is a good choice. Bit by bit, increase the amount you walk every day. Try for at least 30 minutes on most days of the week. · Do not smoke. Smoking can increase your risk for health problems.  If you need help quitting, talk to your doctor about stop-smoking programs and medicines. These can increase your chances of quitting for good. · Limit alcohol to 2 drinks a day for men and 1 drink a day for women. Too much alcohol can cause health problems. If you have a BMI higher than 25 · Your doctor may do other tests to check your risk for weight-related health problems. This may include measuring the distance around your waist. A waist measurement of more than 40 inches in men or 35 inches in women can increase the risk of weight-related health problems. · Talk with your doctor about steps you can take to stay healthy or improve your health. You may need to make lifestyle changes to lose weight and stay healthy, such as changing your diet and getting regular exercise. If you have a BMI lower than 18.5 · Your doctor may do other tests to check your risk for health problems. · Talk with your doctor about steps you can take to stay healthy or improve your health. You may need to make lifestyle changes to gain or maintain weight and stay healthy, such as getting more healthy foods in your diet and doing exercises to build muscle. Where can you learn more? Go to http://jaleel-tadeo.info/. Enter S176 in the search box to learn more about \"Body Mass Index: Care Instructions. \" Current as of: March 28, 2019 Content Version: 12.2 © 2538-9847 [employer]. Care instructions adapted under license by [employer] (which disclaims liability or warranty for this information). If you have questions about a medical condition or this instruction, always ask your healthcare professional. [place] [address] any warranty or liability for your use of this information. Recovering From Depression: Care Instructions Your Care Instructions Taking good care of yourself is important as you recover from depression. In time, your symptoms will fade as your treatment takes hold.  Do not give up. Instead, focus your energy on getting better. Your mood will improve. It just takes some time. Focus on things that can help you feel better, such as being with friends and family, eating well, and getting enough rest. But take things slowly. Do not do too much too soon. You will begin to feel better gradually. Follow-up care is a key part of your treatment and safety. Be sure to make and go to all appointments, and call your doctor if you are having problems. It's also a good idea to know your test results and keep a list of the medicines you take. How can you care for yourself at home? Be realistic · If you have a large task to do, break it up into smaller steps you can handle, and just do what you can. · You may want to put off important decisions until your depression has lifted. If you have plans that will have a major impact on your life, such as marriage, divorce, or a job change, try to wait a bit. Talk it over with friends and loved ones who can help you look at the overall picture first. 
· Reaching out to people for help is important. Do not isolate yourself. Let your family and friends help you. Find someone you can trust and confide in, and talk to that person. · Be patient, and be kind to yourself. Remember that depression is not your fault and is not something you can overcome with willpower alone. Treatment is necessary for depression, just like for any other illness. Feeling better takes time, and your mood will improve little by little. Stay active · Stay busy and get outside. Take a walk, or try some other light exercise. · Talk with your doctor about an exercise program. Exercise can help with mild depression. · Go to a movie or concert. Take part in a Christianity activity or other social gathering. Go to a ball game. · Ask a friend to have dinner with you. Take care of yourself · Eat a balanced diet with plenty of fresh fruits and vegetables, whole grains, and lean protein. If you have lost your appetite, eat small snacks rather than large meals. · Avoid drinking alcohol or using illegal drugs. Do not take medicines that have not been prescribed for you. They may interfere with medicines you may be taking for depression, or they may make your depression worse. · Take your medicines exactly as they are prescribed. You may start to feel better within 1 to 3 weeks of taking antidepressant medicine. But it can take as many as 6 to 8 weeks to see more improvement. If you have questions or concerns about your medicines, or if you do not notice any improvement by 3 weeks, talk to your doctor. · If you have any side effects from your medicine, tell your doctor. Antidepressants can make you feel tired, dizzy, or nervous. Some people have dry mouth, constipation, headaches, sexual problems, or diarrhea. Many of these side effects are mild and will go away on their own after you have been taking the medicine for a few weeks. Some may last longer. Talk to your doctor if side effects are bothering you too much. You might be able to try a different medicine. · Get enough sleep. If you have problems sleeping: 
? Go to bed at the same time every night, and get up at the same time every morning. ? Keep your bedroom dark and quiet. ? Do not exercise after 5:00 p.m. ? Avoid drinks with caffeine after 5:00 p.m. · Avoid sleeping pills unless they are prescribed by the doctor treating your depression. Sleeping pills may make you groggy during the day, and they may interact with other medicine you are taking. · If you have any other illnesses, such as diabetes, heart disease, or high blood pressure, make sure to continue with your treatment. Tell your doctor about all of the medicines you take, including those with or without a prescription.  
· Keep the numbers for these national suicide hotlines: 1-668-625-TALK (4-936-854-509-361-4481) and 7-390-OEBJVMB (1-569-439-954-281-8373). If you or someone you know talks about suicide or feeling hopeless, get help right away. When should you call for help? Call 911 anytime you think you may need emergency care. For example, call if: 
  · You feel like hurting yourself or someone else.  
  · Someone you know has depression and is about to attempt or is attempting suicide.  
[facility] your doctor now or seek immediate medical care if: 
  · You hear voices.  
  · Someone you know has depression and: 
? Starts to give away his or her possessions. ? Uses illegal drugs or drinks alcohol heavily. ? Talks or writes about death, including writing suicide notes or talking about guns, knives, or pills. ? Starts to spend a lot of time alone. ? Acts very aggressively or suddenly appears calm.  
 Watch closely for changes in your health, and be sure to contact your doctor if: 
  · You do not get better as expected. Where can you learn more? Go to http://jaleel-tadeo.info/. Enter W806 in the search box to learn more about \"Recovering From Depression: Care Instructions. \" Current as of: May 28, 2019 Content Version: 12.2 © 5945-8507 [employer]. Care instructions adapted under license by [employer] (which disclaims liability or warranty for this information). If you have questions about a medical condition or this instruction, always ask your healthcare professional. [place] [address] any warranty or liability for your use of this information. DASH Diet: Care Instructions Your Care Instructions The DASH diet is an eating plan that can help lower your blood pressure. DASH stands for Dietary Approaches to Stop Hypertension. Hypertension is high blood pressure. The DASH diet focuses on eating foods that are high in calcium, potassium, and magnesium. These nutrients can lower blood pressure.  The foods that are highest in these nutrients are fruits, vegetables, low-fat dairy products, nuts, seeds, and legumes. But taking calcium, potassium, and magnesium supplements instead of eating foods that are high in those nutrients does not have the same effect. The DASH diet also includes whole grains, fish, and poultry. The DASH diet is one of several lifestyle changes your doctor may recommend to lower your high blood pressure. Your doctor may also want you to decrease the amount of sodium in your diet. Lowering sodium while following the DASH diet can lower blood pressure even further than just the DASH diet alone. Follow-up care is a key part of your treatment and safety. Be sure to make and go to all appointments, and call your doctor if you are having problems. It's also a good idea to know your test results and keep a list of the medicines you take. How can you care for yourself at home? Following the DASH diet · Eat 4 to 5 servings of fruit each day. A serving is 1 medium-sized piece of fruit, ½ cup chopped or canned fruit, 1/4 cup dried fruit, or 4 ounces (½ cup) of fruit juice. Choose fruit more often than fruit juice. · Eat 4 to 5 servings of vegetables each day. A serving is 1 cup of lettuce or raw leafy vegetables, ½ cup of chopped or cooked vegetables, or 4 ounces (½ cup) of vegetable juice. Choose vegetables more often than vegetable juice. · Get 2 to 3 servings of low-fat and fat-free dairy each day. A serving is 8 ounces of milk, 1 cup of yogurt, or 1 ½ ounces of cheese. · Eat 6 to 8 servings of grains each day. A serving is 1 slice of bread, 1 ounce of dry cereal, or ½ cup of cooked rice, pasta, or cooked cereal. Try to choose whole-grain products as much as possible. · Limit lean meat, poultry, and fish to 2 servings each day. A serving is 3 ounces, about the size of a deck of cards.  
· Eat 4 to 5 servings of nuts, seeds, and legumes (cooked dried beans, lentils, and split peas) each week. A serving is 1/3 cup of nuts, 2 tablespoons of seeds, or ½ cup of cooked beans or peas. · Limit fats and oils to 2 to 3 servings each day. A serving is 1 teaspoon of vegetable oil or 2 tablespoons of salad dressing. · Limit sweets and added sugars to 5 servings or less a week. A serving is 1 tablespoon jelly or jam, ½ cup sorbet, or 1 cup of lemonade. · Eat less than 2,300 milligrams (mg) of sodium a day. If you limit your sodium to 1,500 mg a day, you can lower your blood pressure even more. Tips for success · Start small. Do not try to make dramatic changes to your diet all at once. You might feel that you are missing out on your favorite foods and then be more likely to not follow the plan. Make small changes, and stick with them. Once those changes become habit, add a few more changes. · Try some of the following: ? Make it a goal to eat a fruit or vegetable at every meal and at snacks. This will make it easy to get the recommended amount of fruits and vegetables each day. ? Try yogurt topped with fruit and nuts for a snack or healthy dessert. ? Add lettuce, tomato, cucumber, and onion to sandwiches. ? Combine a ready-made pizza crust with low-fat mozzarella cheese and lots of vegetable toppings. Try using tomatoes, squash, spinach, broccoli, carrots, cauliflower, and onions. ? Have a variety of cut-up vegetables with a low-fat dip as an appetizer instead of chips and dip. ? Sprinkle sunflower seeds or chopped almonds over salads. Or try adding chopped walnuts or almonds to cooked vegetables. ? Try some vegetarian meals using beans and peas. Add garbanzo or kidney beans to salads. Make burritos and tacos with mashed ospina beans or black beans. Where can you learn more? Go to http://jaleel-tadeo.info/. Enter P641 in the search box to learn more about \"DASH Diet: Care Instructions. \" Current as of: April 9, 2019 Content Version: 12.2 © 3543-5394 [employer]. Care instructions adapted under license by "[employer] (which disclaims liability or warranty for this information). If you have questions about a medical condition or this instruction, always ask your healthcare professional. [address] any warranty or liability for your use of this information. Influenza (Flu) Vaccine (Inactivated or Recombinant): What You Need to Know Why get vaccinated? Influenza (\"flu\") is a contagious disease that spreads around the [place] every winter, usually between October and May. Flu is caused by influenza viruses and is spread mainly by coughing, sneezing, and close contact. Anyone can get flu. Flu strikes suddenly and can last several days. Symptoms vary by age, but can include: · Fever/chills. · Sore throat. · Muscle aches. · Fatigue. · Cough. · Headache. · Runny or stuffy nose. Flu can also lead to pneumonia and blood infections, and cause diarrhea and seizures in children. If you have a medical condition, such as heart or lung disease, flu can make it worse. Flu is more dangerous for some people. Infants and young children, people 72years of age and older, pregnant women, and people with certain health conditions or a weakened immune system are at greatest risk. Each year thousands of people in the [facility] die from flu, and many more are hospitalized. Flu vaccine can: · Keep you from getting flu. · Make flu less severe if you do get it. · Keep you from spreading flu to your family and other people. Inactivated and recombinant flu vaccines A dose of flu vaccine is recommended every flu season. Children 6 months through 6years of age may need two doses during the same flu season. Everyone else needs only one dose each flu season.  
Some inactivated flu vaccines contain a very small amount of a mercury-based preservative called thimerosal. Studies have not shown thimerosal in vaccines to be harmful, but flu vaccines that do not contain thimerosal are available. There is no live flu virus in flu shots. They cannot cause the flu. There are many flu viruses, and they are always changing. Each year a new flu vaccine is made to protect against three or four viruses that are likely to cause disease in the upcoming flu season. But even when the vaccine doesn't exactly match these viruses, it may still provide some protection. Flu vaccine cannot prevent: · Flu that is caused by a virus not covered by the vaccine. · Illnesses that look like flu but are not. Some people should not get this vaccine Tell the person who is giving you the vaccine: · If you have any severe (life-threatening) allergies. If you ever had a life-threatening allergic reaction after a dose of flu vaccine, or have a severe allergy to any part of this vaccine, you may be advised not to get vaccinated. Most, but not all, types of flu vaccine contain a small amount of egg protein. · If you ever had Guillain-Barré syndrome (also called GBS) Some people with a history of GBS should not get this vaccine. This should be discussed with your doctor. · If you are not feeling well. It is usually okay to get flu vaccine when you have a mild illness, but you might be asked to come back when you feel better. Risks of a vaccine reaction With any medicine, including vaccines, there is a chance of reactions. These are usually mild and go away on their own, but serious reactions are also possible. Most people who get a flu shot do not have any problems with it. Minor problems following a flu shot include: · Soreness, redness, or swelling where the shot was given · Hoarseness · Sore, red or itchy eyes · Cough · Fever · Aches · Headache · Itching · Fatigue If these problems occur, they usually begin soon after the shot and last 1 or 2 days. More serious problems following a flu shot can include the following: · There may be a small increased risk of Guillain-Barré Syndrome (GBS) after inactivated flu vaccine. This risk has been estimated at 1 or 2 additional cases per million people vaccinated. This is much lower than the risk of severe complications from flu, which can be prevented by flu vaccine. · [facility] children who get the flu shot along with pneumococcal vaccine (PCV13) and/or DTaP vaccine at the same time might be slightly more likely to have a seizure caused by fever. Ask your doctor for more information. Tell your doctor if a child who is getting flu vaccine has ever had a seizure Problems that could happen after any injected vaccine: · People sometimes faint after a medical procedure, including vaccination. Sitting or lying down for about 15 minutes can help prevent fainting, and injuries caused by a fall. Tell your doctor if you feel dizzy, or have vision changes or ringing in the ears. · Some people get severe pain in the shoulder and have difficulty moving the arm where a shot was given. This happens very rarely. · Any medication can cause a severe allergic reaction. Such reactions from a vaccine are very rare, estimated at about 1 in a million doses, and would happen within a few minutes to a few hours after the vaccination. As with any medicine, there is a very remote chance of a vaccine causing a serious injury or death. The safety of vaccines is always being monitored. For more information, visit: www.cdc.gov/vaccinesafety/. What if there is a serious reaction? What should I look for? · Look for anything that concerns you, such as signs of a severe allergic reaction, very high fever, or unusual behavior. Signs of a severe allergic reaction can include hives, swelling of the face and throat, difficulty breathing, a fast heartbeat, dizziness, and weakness  usually within a few minutes to a few hours after the vaccination. What should I do? · If you think it is a severe allergic reaction or other emergency that can't wait, call 9-1-1 and get the person to the nearest hospital. Otherwise, call your doctor. · Reactions should be reported to the \"Vaccine Adverse Event Reporting System\" (VAERS). Your doctor should file this report, or you can do it yourself through the VAERS website at www.vaers. [facility].gov, or by calling 4-240.572.4257. VAERS does not give medical advice. The National Vaccine Injury Compensation Program 
The National Vaccine Injury Compensation Program (VICP) is a federal program that was created to compensate people who may have been injured by certain vaccines. Persons who believe they may have been injured by a vaccine can learn about the program and about filing a claim by calling 6-182.733.1238 or visiting the [employer] website at www.[employer].gov/vaccinecompensation. There is a time limit to file a claim for compensation. How can I learn more? · Ask your healthcare provider. He or she can give you the vaccine package insert or suggest other sources of information. · Call your local or state health department. · Contact the Centers for Disease Control and Prevention (CDC): 
? Call 9-597.807.1507 (1-800-CDC-INFO) or 
? Visit CDC's website at www.cdc.gov/flu Vaccine Information Statement Inactivated Influenza Vaccine 8/7/2015) 42 TIFFANIE Radha Johan 226ZS-04 Department of [facility] and [employer] Centers for Disease Control and Prevention Many Vaccine Information Statements are available in Sudanese and other languages. See www.immunize.org/vis. Muchas hojas de información sobre vacunas están disponibles en español y en otros idiomas. Visite www.immunize.org/vis. Care instructions adapted under license by [employer] (which disclaims liability or warranty for this information).  If you have questions about a medical condition or this instruction, always ask your healthcare professional. [address] any warranty or liability for your use of this information. High Blood Pressure: Care Instructions Overview It's normal for blood pressure to go up and down throughout the day. But if it stays up, you have high blood pressure. Another name for high blood pressure is hypertension. Despite what a lot of people think, high blood pressure usually doesn't cause headaches or make you feel dizzy or lightheaded. It usually has no symptoms. But it does increase your risk of stroke, heart attack, and other problems. You and your doctor will talk about your risks of these problems based on your blood pressure. Your doctor will give you a goal for your blood pressure. Your goal will be based on your health and your age. Lifestyle changes, such as eating healthy and being active, are always important to help lower blood pressure. You might also take medicine to reach your blood pressure goal. 
Follow-up care is a key part of your treatment and safety. Be sure to make and go to all appointments, and call your doctor if you are having problems. It's also a good idea to know your test results and keep a list of the medicines you take. How can you care for yourself at home? Medical treatment · If you stop taking your medicine, your blood pressure will go back up. You may take one or more types of medicine to lower your blood pressure. Be safe with medicines. Take your medicine exactly as prescribed. Call your doctor if you think you are having a problem with your medicine. · Talk to your doctor before you start taking aspirin every day. Aspirin can help certain people lower their risk of a heart attack or stroke. But taking aspirin isn't right for everyone, because it can cause serious bleeding. · See your doctor regularly. You may need to see the doctor more often at first or until your blood pressure comes down. · If you are taking blood pressure medicine, talk to your doctor before you take decongestants or anti-inflammatory medicine, such as ibuprofen. Some of these medicines can raise blood pressure. · Learn how to check your blood pressure at home. Lifestyle changes · Stay at a healthy weight. This is especially important if you put on weight around the waist. Losing even 10 pounds can help you lower your blood pressure. · If your doctor recommends it, get more exercise. Walking is a good choice. Bit by bit, increase the amount you walk every day. Try for at least 30 minutes on most days of the week. You also may want to swim, bike, or do other activities. · Avoid or limit alcohol. Talk to your doctor about whether you can drink any alcohol. · Try to limit how much sodium you eat to less than 2,300 milligrams (mg) a day. Your doctor may ask you to try to eat less than 1,500 mg a day. · Eat plenty of fruits (such as bananas and oranges), vegetables, legumes, whole grains, and low-fat dairy products. · Lower the amount of saturated fat in your diet. Saturated fat is found in animal products such as milk, cheese, and meat. Limiting these foods may help you lose weight and also lower your risk for heart disease. · Do not smoke. Smoking increases your risk for heart attack and stroke. If you need help quitting, talk to your doctor about stop-smoking programs and medicines. These can increase your chances of quitting for good. When should you call for help? Call  911 anytime you think you may need emergency care. This may mean having symptoms that suggest that your blood pressure is causing a serious heart or blood vessel problem. Your blood pressure may be over 180/120. 
 For example, call  911 if: 
  · You have symptoms of a heart attack. These may include: 
? Chest pain or pressure, or a strange feeling in the chest. 
? Sweating. ? Shortness of breath. ? Nausea or vomiting. ? Pain, pressure, or a strange feeling in the back, neck, jaw, or upper belly or in one or both shoulders or arms. ? Lightheadedness or sudden weakness. ? A fast or irregular heartbeat.  
  · You have symptoms of a stroke. These may include: 
? Sudden numbness, tingling, weakness, or loss of movement in your face, arm, or leg, especially on only one side of your body. ? Sudden vision changes. ? Sudden trouble speaking. ? Sudden confusion or trouble understanding simple statements. ? Sudden problems with walking or balance. ? A sudden, severe headache that is different from past headaches.  
  · You have severe back or belly pain.  
 Do not wait until your blood pressure comes down on its own. Get help right away. 
 Call your doctor now or seek immediate care if: 
  · Your blood pressure is much higher than normal (such as 180/120 or higher), but you don't have symptoms.  
  · You think high blood pressure is causing symptoms, such as: 
? Severe headache. 
? Blurry vision.  
 Watch closely for changes in your health, and be sure to contact your doctor if: 
  · Your blood pressure measures higher than your doctor recommends at least 2 times. That means the top number is higher or the bottom number is higher, or both.  
  · You think you may be having side effects from your blood pressure medicine. Where can you learn more? Go to http://jaleel-tadeo.info/. Enter G018 in the search box to learn more about \"High Blood Pressure: Care Instructions. \" Current as of: April 9, 2019 Content Version: 12.2 © 8939-7054 [employer]. Care instructions adapted under license by [employer] (which disclaims liability or warranty for this information). If you have questions about a medical condition or this instruction, always ask your healthcare professional. [address] any warranty or liability for your use of this information.

## 2019-09-24 NOTE — PROGRESS NOTES
Dinorah Tucker is a 37 y.o. male presents to office for establish care, htn, and anxiety    Medication list has been reviewed with Dinorah Tucker and updated as of today's date     Health Maintenance items with a due date reviewed with patient:  Health Maintenance Due   Topic Date Due    DTaP/Tdap/Td series (1 - Tdap) 03/18/1999    Influenza Age 5 to Adult  08/01/2019

## 2019-09-25 LAB
CHOLEST SERPL-MCNC: 215 MG/DL
HDLC SERPL-MCNC: 34 MG/DL (ref 40–60)
HDLC SERPL: 6.3 {RATIO} (ref 0–5)
LDLC SERPL CALC-MCNC: 153 MG/DL (ref 0–100)
LIPID PROFILE,FLP: ABNORMAL
TRIGL SERPL-MCNC: 140 MG/DL (ref ?–150)
VLDLC SERPL CALC-MCNC: 28 MG/DL

## 2020-03-27 ENCOUNTER — VIRTUAL VISIT (OUTPATIENT)
Dept: FAMILY MEDICINE CLINIC | Age: 40
End: 2020-03-27

## 2020-03-27 DIAGNOSIS — K21.9 GASTROESOPHAGEAL REFLUX DISEASE WITHOUT ESOPHAGITIS: Primary | ICD-10-CM

## 2020-03-27 DIAGNOSIS — R11.0 NAUSEA: ICD-10-CM

## 2020-03-27 DIAGNOSIS — R10.33 PERIUMBILICAL ABDOMINAL PAIN: ICD-10-CM

## 2020-03-27 RX ORDER — ONDANSETRON 4 MG/1
4-8 TABLET, ORALLY DISINTEGRATING ORAL
Qty: 90 TAB | Refills: 0 | OUTPATIENT
Start: 2020-03-27 | End: 2021-02-02

## 2020-03-27 RX ORDER — OMEPRAZOLE 40 MG/1
40 CAPSULE, DELAYED RELEASE ORAL DAILY
Qty: 30 CAP | Refills: 0 | Status: SHIPPED | OUTPATIENT
Start: 2020-03-27

## 2020-03-27 NOTE — PROGRESS NOTES
Chief Complaint   Patient presents with    Nausea     x 1 year everyday when he wakes up    Vomiting     few times- x 1 week     Abdominal Pain    GERD     1. Have you been to the ER, urgent care clinic since your last visit? Hospitalized since your last visit? NO    2. Have you seen or consulted any other health care providers outside of the [address] [place] [address] since your last visit? Include any pap smears or colon screening.  NO

## 2020-03-27 NOTE — PATIENT INSTRUCTIONS
Soft-Textured, [place] Diet: Care Instructions Your Care Instructions A soft-textured, bland diet is used when you need food that is easy to chew, swallow, and digest. You will need to choose soft foods that are low in spices and seasonings. You will need to avoid high-fat foods, as well as caffeine and alcohol. Your doctor or dietitian can help you plan a soft-textured, bland diet based on your health and what you prefer to eat. Ask your doctor how long you should stay on this diet. As you get better, you will probably be able to go back to a regular diet. Talk with your doctor or dietitian before you make changes in your diet. Follow-up care is a key part of your treatment and safety. Be sure to make and go to all appointments, and call your doctor if you are having problems. It's also a good idea to know your test results and keep a list of the medicines you take. How can you care for yourself at home? · Choose foods that are easy to chew and swallow. Good choices are mashed potatoes, soft breads and rolls, cream soups, oatmeal, and Cream of Wheat. · Choose soft, well-cooked vegetables and soft or canned fruits. Good choices are applesauce, ripe bananas, and non-citrus fruit juice. · Try milk, yogurt, or other milk products, if you can digest dairy without too many problems. Your doctor may limit milk and milk products for a while. If so, he or she may recommend a calcium and vitamin D supplement. · Choose soft protein foods such as eggs, tofu, steamed fish, chicken, and turkey. Slow-cooking methods, such as stewing, will help soften meat. Chopping meat in a  or  also will make it easier to eat. · Avoid nuts, raw vegetables, hard crackers, tough meats, and prunes and prune juice. · Avoid foods that are very spicy, such as foods seasoned with black pepper, chili peppers, horseradish, or hot sauce.  
· Avoid highly acidic foods such as citrus fruits, citrus fruit juices, and tomato-based foods. · Avoid high-fat foods such as fried meat, chips, and rich desserts. · Check with your doctor before you drink alcohol or beverages that have caffeine, such as coffee, tea, and cola beverages. Where can you learn more? Go to http://jaleel-tadeo.info/ Enter O902 in the search box to learn more about \"Soft-Textured, [place] Diet: Care Instructions. \" Current as of: August 21, 2019Content Version: 12.4 © 4079-8002 [employer]. Care instructions adapted under license by [employer] (which disclaims liability or warranty for this information). If you have questions about a medical condition or this instruction, always ask your healthcare professional. [address] any warranty or liability for your use of this information. Peptic Ulcer Disease: Care Instructions Your Care Instructions Peptic ulcers are sores on the inside of the stomach or the small intestine (such as a duodenal ulcer). They are most often caused by an infection with bacteria or from use of nonsteroidal anti-inflammatory drugs (NSAIDs). NSAIDs include aspirin, ibuprofen (Advil), and naproxen (Aleve). Your doctor may have prescribed medicine to reduce stomach acid. You also may need to take antibiotics if your peptic ulcers are caused by an infection. You can help yourself heal and keep ulcers from coming back by making some changes in your lifestyle. Quit smoking. Limit alcohol. Follow-up care is a key part of your treatment and safety. Be sure to make and go to all appointments, and call your doctor if you are having problems. It's also a good idea to know your test results and keep a list of the medicines you take. How can you care for yourself at home? · Be safe with medicines. Take your medicines exactly as prescribed. Call your doctor if you think you are having a problem with your medicine. · Do not take aspirin or other NSAIDs such as ibuprofen (Advil or Motrin) or naproxen (Aleve). Ask your doctor what you can take for pain. · Do not smoke. Smoking can make ulcers worse. If you need help quitting, talk to your doctor about stop-smoking programs and medicines. These can increase your chances of quitting for good. · Drink in moderation or avoid drinking alcohol. · Eat a balanced diet of small, frequent meals. See a dietitian if you need help planning your meals. When should you call for help? Call 911 anytime you think you may need emergency care. For example, call if: 
  · You vomit blood or what looks like coffee grounds.  
  · You pass maroon or very bloody stools.  
 Call your doctor now or seek immediate medical care if: 
  · You have new or worse belly pain.  
  · Your stools are black and look like tar, or they have streaks of blood.  
  · You vomit.  
 Watch closely for changes in your health, and be sure to contact your doctor if: 
  · You do not get better as expected. Where can you learn more? Go to http://jaleel-tadeo.info/ Enter D762 in the search box to learn more about \"Peptic Ulcer Disease: Care Instructions. \" Current as of: August 11, 2019Content Version: 12.4 © 1976-7850 [employer]. Care instructions adapted under license by [employer] (which disclaims liability or warranty for this information). If you have questions about a medical condition or this instruction, always ask your healthcare professional. [place] [address] any warranty or liability for your use of this information.

## 2020-03-27 NOTE — PROGRESS NOTES
Rosina Spann is a 41 y.o. male who was seen by synchronous (real-time) audio-video technology on 3/27/2020. Consent:  He and/or his healthcare decision maker is aware that this patient-initiated Telehealth encounter is a billable service, with coverage as determined by his insurance carrier. He is aware that he may receive a bill and has provided verbal consent to proceed: Yes    I was in the office while conducting this encounter. Assessment & Plan:   Diagnoses and all orders for this visit:    1. Gastroesophageal reflux disease without esophagitis  -     omeprazole (PRILOSEC) 40 mg capsule; Take 1 Cap by mouth daily. 2. Nausea  -     ondansetron (ZOFRAN ODT) 4 mg disintegrating tablet; Take 1-2 Tabs by mouth every eight (8) hours as needed for Nausea or Vomiting. 3. Periumbilical abdominal pain    At this time his symptoms seem to be most consistent with reflux or peptic ulcer disease  Will start him on Prilosec 40 mg once a day  We will give him a prescription for Zofran 1 to 2 tablets to help with the nausea  We will mail him a handout on foods to eat to help prevent GERD  Recommend no spicy, no pepper, no citrus, no tomato, low acid      Follow-up and Dispositions    · Return in about 4 weeks (around 4/24/2020) for GERD, abd pain, 15 minutes. mail handout on foods to eat  Recommend no spicy, no pepper, no citrus, no tomato, low acid    Coding Help - Use CPT Codes 82288-66832, 84743-67537 for Established and New Patients respectively, either employing EM elements or Time rules. Other codes (example consult codes) may also apply. 712  Subjective:   Rosina Spann was seen for Nausea (x 1 year everyday when he wakes up); Vomiting (few times- x 1 week );  Abdominal Pain; and GERD       Nausea  Onset: over a year ago  Chacteristics: just started waking up sick one day, thought it could be the bed so he bought a new bed, everyday he wakes up he feels nauseated and get nauseated throught out the day, also endorses abd pain below and to the side of his belly button, no pain with palpation by patient, he did not palpate masses or dips  Vomiting started last few weeks, 3-4 times, stomach acid, a few times a little bit of blood, vomit was a little bit  Endorse intermittent constipation, \"a little bit of blood in stool\" sometimes, stools are light brown  Endorses indigestion: sometimes so bad chest feels like it is on fire  Treatments: my[place], prilosec OTC-only took while having indigestion  Relieving: did not help  Current weight per patient: thinks it is less than 280  Unable to do own b/p  abd soft, protubuent    Prior to Admission medications    Medication Sig Start Date End Date Taking? Authorizing Provider   omeprazole (PRILOSEC) 40 mg capsule Take 1 Cap by mouth daily. 3/27/20  Yes Anita Santiago NP   ondansetron (ZOFRAN ODT) 4 mg disintegrating tablet Take 1-2 Tabs by mouth every eight (8) hours as needed for Nausea or Vomiting. 3/27/20  Yes Anita Santiago NP   hydroCHLOROthiazide (HYDRODIURIL) 25 mg tablet Take 1 Tab by mouth daily. 9/24/19  Yes Anita Santiago NP   lisinopril (PRINIVIL, ZESTRIL) 10 mg tablet Take 1 Tab by mouth daily. Indications: high blood pressure 9/24/19  Yes Anita Santiago NP   sertraline (ZOLOFT) 100 mg tablet Take 2 Tabs by mouth daily. 9/24/19  Yes Anita Santiago NP   triamcinolone acetonide (KENALOG) 0.1 % topical cream Apply  to affected area two (2) times a day.  use thin layer 9/24/19  Yes Anita Santiago NP     No Known Allergies    Patient Active Problem List   Diagnosis Code    Hypertension I10    Anxiety F41.9    Obesity, morbid ([address] E66.01    Depressed mood F32.9     Patient Active Problem List    Diagnosis Date Noted    Obesity, morbid ([address] 07/06/2018    Depressed mood 07/06/2018    Hypertension 07/03/2018    Anxiety 07/03/2018     Current Outpatient Medications   Medication Sig Dispense Refill    omeprazole (PRILOSEC) 40 mg capsule Take 1 Cap by mouth daily. 30 Cap 0    ondansetron (ZOFRAN ODT) 4 mg disintegrating tablet Take 1-2 Tabs by mouth every eight (8) hours as needed for Nausea or Vomiting. 90 Tab 0    hydroCHLOROthiazide (HYDRODIURIL) 25 mg tablet Take 1 Tab by mouth daily. 90 Tab 1    lisinopril (PRINIVIL, ZESTRIL) 10 mg tablet Take 1 Tab by mouth daily. Indications: high blood pressure 90 Tab 1    sertraline (ZOLOFT) 100 mg tablet Take 2 Tabs by mouth daily. 180 Tab 3    triamcinolone acetonide (KENALOG) 0.1 % topical cream Apply  to affected area two (2) times a day. use thin layer 15 g 0     No Known Allergies  Past Medical History:   Diagnosis Date    Anxiety     Hypertension      Past Surgical History:   Procedure Laterality Date    HX HEENT      eye trauma age 6     Family History   Problem Relation Age of Onset    Hypertension Brother      Social History     Tobacco Use    Smoking status: Current Every Day Smoker     Packs/day: 1.00     Years: 2.00     Pack years: 2.00     Types: Cigarettes    Smokeless tobacco: Never Used   Substance Use Topics    Alcohol use: No     Comment: h/o alcohol dependence       ROS As stated in HPI, otherwise all others negative. PHYSICAL EXAMINATION:  [ INSTRUCTIONS:  \"[x]\" Indicates a positive item  \"[]\" Indicates a negative item  -- DELETE ALL ITEMS NOT EXAMINED]  Vital Signs: (As obtained by patient/caregiver at home)  There were no vitals taken for this visit.      Constitutional: [x] Appears well-developed and well-nourished [x] No apparent distress      [] Abnormal -     Mental status: [x] Alert and awake  [x] Oriented to person/place/time [x] Able to follow commands    [] Abnormal -     Eyes:   EOM    [x]  Normal    [] Abnormal -   Sclera  [x]  Normal    [] Abnormal -          Discharge [x]  None visible   [] Abnormal -     HENT: [x] Normocephalic, atraumatic  [] Abnormal -   [x] Mouth/Throat: Mucous membranes are moist    External Ears [x] Normal [] Abnormal -    Neck: [x] No visualized mass [] Abnormal -     Pulmonary/Chest: [x] Respiratory effort normal   [x] No visualized signs of difficulty breathing or respiratory distress        [] Abnormal -      Musculoskeletal:   [x] Normal gait with no signs of ataxia         [x] Normal range of motion of neck        [] Abnormal -     Neurological:        [x] No Facial Asymmetry (Cranial nerve 7 motor function) (limited exam due to video visit)          [x] No gaze palsy        [] Abnormal -          Skin:        [x] No significant exanthematous lesions or discoloration noted on facial skin         [] Abnormal -            Psychiatric:       [x] Normal Affect [] Abnormal -        [x] No Hallucinations    Other pertinent observable physical exam findings:-Patient palpated on abdomen and denied feeling any masses or hernias        We discussed the expected course, resolution and complications of the diagnosis(es) in detail. Medication risks, benefits, costs, interactions, and alternatives were discussed as indicated. I advised him to contact the office if his condition worsens, changes or fails to improve as anticipated. He expressed understanding with the diagnosis(es) and plan. Pursuant to the emergency declaration under the [facility] waiver authority and the [employer] Act, this Virtual  Visit was conducted, with patient's consent, to reduce the patient's risk of exposure to COVID-19 and provide continuity of care for an established patient. Services were provided through a video synchronous discussion virtually to substitute for in-person clinic visit.     Paz Farfan NP

## 2021-02-02 ENCOUNTER — HOSPITAL ENCOUNTER (EMERGENCY)
Age: 41
Discharge: HOME | End: 2021-02-02
Attending: EMERGENCY MEDICINE
Payer: MEDICAID

## 2021-02-02 ENCOUNTER — APPOINTMENT (OUTPATIENT)
Dept: CT IMAGING | Age: 41
End: 2021-02-02
Attending: EMERGENCY MEDICINE
Payer: MEDICAID

## 2021-02-02 VITALS
OXYGEN SATURATION: 97 % | RESPIRATION RATE: 17 BRPM | DIASTOLIC BLOOD PRESSURE: 87 MMHG | WEIGHT: 280 LBS | HEIGHT: 69 IN | BODY MASS INDEX: 41.47 KG/M2 | TEMPERATURE: 97.7 F | SYSTOLIC BLOOD PRESSURE: 137 MMHG | HEART RATE: 71 BPM

## 2021-02-02 DIAGNOSIS — R11.2 NON-INTRACTABLE VOMITING WITH NAUSEA, UNSPECIFIED VOMITING TYPE: ICD-10-CM

## 2021-02-02 DIAGNOSIS — K57.30 DIVERTICULOSIS OF COLON: ICD-10-CM

## 2021-02-02 DIAGNOSIS — R10.33 ABDOMINAL PAIN, ACUTE, PERIUMBILICAL: Primary | ICD-10-CM

## 2021-02-02 DIAGNOSIS — E86.0 DEHYDRATION: ICD-10-CM

## 2021-02-02 LAB
ALBUMIN SERPL-MCNC: 3.7 G/DL (ref 3.4–5)
ALBUMIN/GLOB SERPL: 1 {RATIO} (ref 0.8–1.7)
ALP SERPL-CCNC: 127 U/L (ref 45–117)
ALT SERPL-CCNC: 28 U/L (ref 16–61)
ANION GAP SERPL CALC-SCNC: 3 MMOL/L (ref 3–18)
AST SERPL-CCNC: 17 U/L (ref 10–38)
BASOPHILS # BLD: 0 K/UL (ref 0–0.1)
BASOPHILS NFR BLD: 0 % (ref 0–2)
BILIRUB SERPL-MCNC: 0.5 MG/DL (ref 0.2–1)
BUN SERPL-MCNC: 17 MG/DL (ref 7–18)
BUN/CREAT SERPL: 18 (ref 12–20)
CALCIUM SERPL-MCNC: 9.1 MG/DL (ref 8.5–10.1)
CHLORIDE SERPL-SCNC: 107 MMOL/L (ref 100–111)
CO2 SERPL-SCNC: 29 MMOL/L (ref 21–32)
CREAT SERPL-MCNC: 0.92 MG/DL (ref 0.6–1.3)
CREAT UR-MCNC: 0.8 MG/DL (ref 0.6–1.3)
DIFFERENTIAL METHOD BLD: ABNORMAL
EOSINOPHIL # BLD: 0.2 K/UL (ref 0–0.4)
EOSINOPHIL NFR BLD: 2 % (ref 0–5)
ERYTHROCYTE [DISTWIDTH] IN BLOOD BY AUTOMATED COUNT: 13.5 % (ref 11.6–14.5)
GLOBULIN SER CALC-MCNC: 3.7 G/DL (ref 2–4)
GLUCOSE SERPL-MCNC: 123 MG/DL (ref 74–99)
HCT VFR BLD AUTO: 51 % (ref 36–48)
HGB BLD-MCNC: 16.4 G/DL (ref 13–16)
LYMPHOCYTES # BLD: 2.1 K/UL (ref 0.9–3.6)
LYMPHOCYTES NFR BLD: 19 % (ref 21–52)
MCH RBC QN AUTO: 29.7 PG (ref 24–34)
MCHC RBC AUTO-ENTMCNC: 32.2 G/DL (ref 31–37)
MCV RBC AUTO: 92.4 FL (ref 74–97)
MONOCYTES # BLD: 0.5 K/UL (ref 0.05–1.2)
MONOCYTES NFR BLD: 5 % (ref 3–10)
NEUTS SEG # BLD: 7.9 K/UL (ref 1.8–8)
NEUTS SEG NFR BLD: 74 % (ref 40–73)
PLATELET # BLD AUTO: 215 K/UL (ref 135–420)
PMV BLD AUTO: 11.1 FL (ref 9.2–11.8)
POTASSIUM SERPL-SCNC: 4.4 MMOL/L (ref 3.5–5.5)
PROT SERPL-MCNC: 7.4 G/DL (ref 6.4–8.2)
RBC # BLD AUTO: 5.52 M/UL (ref 4.7–5.5)
SODIUM SERPL-SCNC: 139 MMOL/L (ref 136–145)
WBC # BLD AUTO: 10.7 K/UL (ref 4.6–13.2)

## 2021-02-02 PROCEDURE — 96375 TX/PRO/DX INJ NEW DRUG ADDON: CPT

## 2021-02-02 PROCEDURE — 99283 EMERGENCY DEPT VISIT LOW MDM: CPT

## 2021-02-02 PROCEDURE — 74177 CT ABD & PELVIS W/CONTRAST: CPT

## 2021-02-02 PROCEDURE — 82565 ASSAY OF CREATININE: CPT

## 2021-02-02 PROCEDURE — 74011250637 HC RX REV CODE- 250/637: Performed by: EMERGENCY MEDICINE

## 2021-02-02 PROCEDURE — 85025 COMPLETE CBC W/AUTO DIFF WBC: CPT

## 2021-02-02 PROCEDURE — 96365 THER/PROPH/DIAG IV INF INIT: CPT

## 2021-02-02 PROCEDURE — 74011250636 HC RX REV CODE- 250/636: Performed by: EMERGENCY MEDICINE

## 2021-02-02 PROCEDURE — 74011000258 HC RX REV CODE- 258: Performed by: EMERGENCY MEDICINE

## 2021-02-02 PROCEDURE — 74011000636 HC RX REV CODE- 636: Performed by: EMERGENCY MEDICINE

## 2021-02-02 PROCEDURE — 80053 COMPREHEN METABOLIC PANEL: CPT

## 2021-02-02 PROCEDURE — 96372 THER/PROPH/DIAG INJ SC/IM: CPT

## 2021-02-02 RX ORDER — DICYCLOMINE HYDROCHLORIDE 20 MG/1
20 TABLET ORAL
Qty: 20 TAB | Refills: 0 | Status: SHIPPED | OUTPATIENT
Start: 2021-02-02

## 2021-02-02 RX ORDER — DIPHENHYDRAMINE HYDROCHLORIDE 50 MG/ML
25 INJECTION, SOLUTION INTRAMUSCULAR; INTRAVENOUS
Status: COMPLETED | OUTPATIENT
Start: 2021-02-02 | End: 2021-02-02

## 2021-02-02 RX ORDER — KETOROLAC TROMETHAMINE 30 MG/ML
30 INJECTION, SOLUTION INTRAMUSCULAR; INTRAVENOUS
Status: COMPLETED | OUTPATIENT
Start: 2021-02-02 | End: 2021-02-02

## 2021-02-02 RX ORDER — HYDROMORPHONE HYDROCHLORIDE 1 MG/ML
0.5 INJECTION, SOLUTION INTRAMUSCULAR; INTRAVENOUS; SUBCUTANEOUS
Status: DISCONTINUED | OUTPATIENT
Start: 2021-02-02 | End: 2021-02-02

## 2021-02-02 RX ORDER — DICYCLOMINE HYDROCHLORIDE 10 MG/ML
20 INJECTION INTRAMUSCULAR
Status: COMPLETED | OUTPATIENT
Start: 2021-02-02 | End: 2021-02-02

## 2021-02-02 RX ORDER — ACETAMINOPHEN 500 MG
1000 TABLET ORAL
Qty: 40 TAB | Refills: 0 | Status: SHIPPED | OUTPATIENT
Start: 2021-02-02

## 2021-02-02 RX ORDER — PROMETHAZINE HYDROCHLORIDE 25 MG/1
25 SUPPOSITORY RECTAL
Qty: 12 SUPPOSITORY | Refills: 0 | Status: SHIPPED | OUTPATIENT
Start: 2021-02-02 | End: 2021-02-09

## 2021-02-02 RX ORDER — METOCLOPRAMIDE HYDROCHLORIDE 5 MG/ML
5 INJECTION INTRAMUSCULAR; INTRAVENOUS
Status: COMPLETED | OUTPATIENT
Start: 2021-02-02 | End: 2021-02-02

## 2021-02-02 RX ORDER — ONDANSETRON 4 MG/1
4-8 TABLET, ORALLY DISINTEGRATING ORAL
Qty: 12 TAB | Refills: 0 | Status: SHIPPED | OUTPATIENT
Start: 2021-02-02

## 2021-02-02 RX ORDER — METRONIDAZOLE 500 MG/1
500 TABLET ORAL 2 TIMES DAILY
Qty: 14 TAB | Refills: 0 | Status: SHIPPED | OUTPATIENT
Start: 2021-02-02 | End: 2021-02-09

## 2021-02-02 RX ORDER — METRONIDAZOLE 250 MG/1
500 TABLET ORAL
Status: COMPLETED | OUTPATIENT
Start: 2021-02-02 | End: 2021-02-02

## 2021-02-02 RX ORDER — CEFDINIR 300 MG/1
300 CAPSULE ORAL 2 TIMES DAILY
Qty: 14 CAP | Refills: 0 | Status: SHIPPED | OUTPATIENT
Start: 2021-02-02 | End: 2021-02-09

## 2021-02-02 RX ORDER — ONDANSETRON 2 MG/ML
4 INJECTION INTRAMUSCULAR; INTRAVENOUS
Status: COMPLETED | OUTPATIENT
Start: 2021-02-02 | End: 2021-02-02

## 2021-02-02 RX ORDER — NAPROXEN 500 MG/1
500 TABLET ORAL 2 TIMES DAILY WITH MEALS
Qty: 20 TAB | Refills: 0 | Status: SHIPPED | OUTPATIENT
Start: 2021-02-02 | End: 2021-02-12

## 2021-02-02 RX ADMIN — SODIUM CHLORIDE 1000 ML: 9 INJECTION, SOLUTION INTRAVENOUS at 04:23

## 2021-02-02 RX ADMIN — ONDANSETRON 4 MG: 2 INJECTION INTRAMUSCULAR; INTRAVENOUS at 04:13

## 2021-02-02 RX ADMIN — SODIUM CHLORIDE 1000 ML: 9 INJECTION, SOLUTION INTRAVENOUS at 04:24

## 2021-02-02 RX ADMIN — METOCLOPRAMIDE 5 MG: 5 INJECTION, SOLUTION INTRAMUSCULAR; INTRAVENOUS at 04:13

## 2021-02-02 RX ADMIN — METRONIDAZOLE 500 MG: 250 TABLET ORAL at 04:58

## 2021-02-02 RX ADMIN — DICYCLOMINE HYDROCHLORIDE 20 MG: 10 INJECTION INTRAMUSCULAR at 04:14

## 2021-02-02 RX ADMIN — KETOROLAC TROMETHAMINE 30 MG: 30 INJECTION, SOLUTION INTRAMUSCULAR at 04:13

## 2021-02-02 RX ADMIN — DIPHENHYDRAMINE HYDROCHLORIDE 25 MG: 50 INJECTION, SOLUTION INTRAMUSCULAR; INTRAVENOUS at 04:14

## 2021-02-02 RX ADMIN — IOPAMIDOL 100 ML: 612 INJECTION, SOLUTION INTRAVENOUS at 03:44

## 2021-02-02 RX ADMIN — CEFTRIAXONE 1 G: 1 INJECTION, POWDER, FOR SOLUTION INTRAMUSCULAR; INTRAVENOUS at 04:58

## 2021-02-02 NOTE — ED PROVIDER NOTES
Darwin Loja is a 41 y.o. male with history of hypertension with complaints of lower abdominal pain that started about 5 hours ago with nausea and vomiting but no diarrhea or urinary symptoms. Pain is sharp and stabbing. Worse with palpation and movement. He has not taking thing for it. No blood in his emesis. Decreased appetite    The history is provided by medical records and the patient.         Past Medical History:   Diagnosis Date    Anxiety     Hypertension        Past Surgical History:   Procedure Laterality Date    HX HEENT      eye trauma age 6         Family History:   Problem Relation Age of Onset    Hypertension Brother        Social History     Socioeconomic History    Marital status:      Spouse name: Not on file    Number of children: Not on file    Years of education: Not on file    Highest education level: Not on file   Occupational History    Not on file   Social Needs    Financial resource strain: Not on file    Food insecurity     Worry: Not on file     Inability: Not on file    Transportation needs     Medical: Not on file     Non-medical: Not on file   Tobacco Use    Smoking status: Current Every Day Smoker     Packs/day: 1.00     Years: 2.00     Pack years: 2.00     Types: Cigarettes    Smokeless tobacco: Never Used   Substance and Sexual Activity    Alcohol use: No     Comment: h/o alcohol dependence    Drug use: No    Sexual activity: Yes     Partners: Female   Lifestyle    Physical activity     Days per week: Not on file     Minutes per session: Not on file    Stress: Not on file   Relationships    Social connections     Talks on phone: Not on file     Gets together: Not on file     Attends [employer] service: Not on file     Active member of club or organization: Not on file     Attends meetings of clubs or organizations: Not on file     Relationship status: Not on file    Intimate partner violence     Fear of current or ex partner: Not on file Emotionally abused: Not on file     Physically abused: Not on file     Forced sexual activity: Not on file   Other Topics Concern    Not on file   Social History Narrative    Not on file         ALLERGIES: Patient has no known allergies. Review of Systems   Constitutional: Positive for appetite change. Negative for fever. HENT: Negative for sore throat and trouble swallowing. Eyes: Negative for visual disturbance. Respiratory: Negative for cough and shortness of breath. Cardiovascular: Negative for chest pain. Gastrointestinal: Positive for abdominal pain. Negative for blood in stool and constipation. Genitourinary: Negative for difficulty urinating and dysuria. Musculoskeletal: Negative for gait problem. Skin: Negative for rash. Allergic/Immunologic: Negative for immunocompromised state. Neurological: Negative for syncope. Psychiatric/Behavioral: Positive for sleep disturbance. Vitals:    02/02/21 0256   BP: 137/87   Pulse: 71   Resp: 17   Temp: 97.7 °F (36.5 °C)   SpO2: 97%   Weight: 127 kg (280 lb)   Height: 5' 9\" (1.753 m)            Physical Exam  Vitals signs and nursing note reviewed. Constitutional:       General: He is in acute distress. Appearance: He is obese. He is ill-appearing. He is not toxic-appearing or diaphoretic. HENT:      Head: Normocephalic and atraumatic. Right Ear: External ear normal.      Left Ear: External ear normal.      Nose: Nose normal.      Mouth/Throat:      Pharynx: No oropharyngeal exudate. Eyes:      Conjunctiva/sclera: Conjunctivae normal.   Neck:      Musculoskeletal: Normal range of motion. Cardiovascular:      Rate and Rhythm: Normal rate and regular rhythm. Heart sounds: Normal heart sounds. Pulmonary:      Effort: Pulmonary effort is normal. No respiratory distress. Breath sounds: Normal breath sounds. Abdominal:      General: Abdomen is protuberant. Palpations: Abdomen is soft.  There is no hepatomegaly or splenomegaly. Tenderness: There is abdominal tenderness in the right lower quadrant and suprapubic area. There is guarding. There is no right CVA tenderness, left CVA tenderness or rebound. Positive signs include McBurney's sign. Negative signs include Rpiest's sign and Rovsing's sign. Musculoskeletal: Normal range of motion. Skin:     General: Skin is warm and dry. Neurological:      Mental Status: He is alert and oriented to person, place, and time.    Psychiatric:         Behavior: Behavior normal.          MDM       Procedures    Vitals:  Patient Vitals for the past 12 hrs:   Temp Pulse Resp BP SpO2   02/02/21 0256 97.7 °F (36.5 °C) 71 17 137/87 97 %         Medications ordered:   Medications   sodium chloride 0.9 % bolus infusion 1,000 mL (1,000 mL IntraVENous New Bag 2/2/21 0424)   sodium chloride 0.9 % bolus infusion 1,000 mL (1,000 mL IntraVENous New Bag 2/2/21 0423)   cefTRIAXone (ROCEPHIN) 1 g in 0.9% sodium chloride (MBP/ADV) 50 mL MBP (has no administration in time range)   metroNIDAZOLE (FLAGYL) tablet 500 mg (has no administration in time range)   ketorolac (TORADOL) injection 30 mg (30 mg IntraVENous Given 2/2/21 0413)   ondansetron (ZOFRAN) injection 4 mg (4 mg IntraVENous Given 2/2/21 0413)   dicyclomine (BENTYL) 10 mg/mL injection 20 mg (20 mg IntraMUSCular Given 2/2/21 3564)   iopamidoL (ISOVUE 300) 61 % contrast injection 100 mL (100 mL IntraVENous Given 2/2/21 6284)   metoclopramide HCl (REGLAN) injection 5 mg (5 mg IntraVENous Given 2/2/21 6253)   diphenhydrAMINE (BENADRYL) injection 25 mg (25 mg IntraVENous Given 2/2/21 8434)         Lab findings:  Recent Results (from the past 12 hour(s))   CBC WITH AUTOMATED DIFF    Collection Time: 02/02/21  3:08 AM   Result Value Ref Range    WBC 10.7 4.6 - 13.2 K/uL    RBC 5.52 (H) 4.70 - 5.50 M/uL    HGB 16.4 (H) 13.0 - 16.0 g/dL    HCT 51.0 (H) 36.0 - 48.0 %    MCV 92.4 74.0 - 97.0 FL    MCH 29.7 24.0 - 34.0 PG    MCHC 32.2 31.0 - 37.0 g/dL    RDW 13.5 11.6 - 14.5 %    PLATELET 230 969 - 117 K/uL    MPV 11.1 9.2 - 11.8 FL    NEUTROPHILS 74 (H) 40 - 73 %    LYMPHOCYTES 19 (L) 21 - 52 %    MONOCYTES 5 3 - 10 %    EOSINOPHILS 2 0 - 5 %    BASOPHILS 0 0 - 2 %    ABS. NEUTROPHILS 7.9 1.8 - 8.0 K/UL    ABS. LYMPHOCYTES 2.1 0.9 - 3.6 K/UL    ABS. MONOCYTES 0.5 0.05 - 1.2 K/UL    ABS. EOSINOPHILS 0.2 0.0 - 0.4 K/UL    ABS. BASOPHILS 0.0 0.0 - 0.1 K/UL    DF AUTOMATED     METABOLIC PANEL, COMPREHENSIVE    Collection Time: 02/02/21  3:08 AM   Result Value Ref Range    Sodium 139 136 - 145 mmol/L    Potassium 4.4 3.5 - 5.5 mmol/L    Chloride 107 100 - 111 mmol/L    CO2 29 21 - 32 mmol/L    Anion gap 3 3.0 - 18 mmol/L    Glucose 123 (H) 74 - 99 mg/dL    BUN 17 7.0 - 18 MG/DL    Creatinine 0.92 0.6 - 1.3 MG/DL    BUN/Creatinine ratio 18 12 - 20      GFR est AA >60 >60 ml/min/1.73m2    GFR est non-AA >60 >60 ml/min/1.73m2    Calcium 9.1 8.5 - 10.1 MG/DL    Bilirubin, total 0.5 0.2 - 1.0 MG/DL    ALT (SGPT) 28 16 - 61 U/L    AST (SGOT) 17 10 - 38 U/L    Alk. phosphatase 127 (H) 45 - 117 U/L    Protein, total 7.4 6.4 - 8.2 g/dL    Albumin 3.7 3.4 - 5.0 g/dL    Globulin 3.7 2.0 - 4.0 g/dL    A-G Ratio 1.0 0.8 - 1.7     CREATININE, POC    Collection Time: 02/02/21  3:28 AM   Result Value Ref Range    Creatinine, POC 0.8 0.6 - 1.3 MG/DL    GFRAA, POC >60 >60 ml/min/1.73m2    GFRNA, POC >60 >60 ml/min/1.73m2       EKG interpretation by ED Physician:      X-Ray, CT or other radiology findings or impressions:  CT ABD PELV W CONT    (Results Pending)   Multifocal nondependent gas noted in the gallbladder lumen like represents castration with numerous stones. Notes of gas within the gallbladder wall. Negative associated plantar changes or biliary duct dilatation  Prominence of fluid-filled distal small bowel small amount of wall thickening and trace associated fat stranding. Can represent mild enteritis. Appendix is unremarkable.   Diverticulosis without evidence of diverticulitis. Progress notes, Consult notes or additional Procedure notes:   Pain is improved. Possible viral enteritis possible early diverticulitis. Will place on antibiotics for possible early diverticulitis. Do not feel patient requires admission or other work-up at this time    I have discussed with patient and/or family/sig other the results, interpretation of any imaging if performed, suspected diagnosis and treatment plan to include instructions regarding the diagnoses listed to which understanding was expressed with all questions answered      Reevaluation of patient:   stable    Disposition:  Diagnosis:   1. Abdominal pain, acute, periumbilical    2. Non-intractable vomiting with nausea, unspecified vomiting type    3. Dehydration    4. Diverticulosis of colon        Disposition: home    Follow-up Information    None           Patient's Medications   Start Taking    ACETAMINOPHEN (TYLENOL EXTRA STRENGTH) 500 MG TABLET    Take 2 Tabs by mouth every six (6) hours as needed for Pain. CEFDINIR (OMNICEF) 300 MG CAPSULE    Take 1 Cap by mouth two (2) times a day for 7 days. DICYCLOMINE (BENTYL) 20 MG TABLET    Take 1 Tab by mouth every six (6) hours as needed for Abdominal Cramps. METRONIDAZOLE (FLAGYL) 500 MG TABLET    Take 1 Tab by mouth two (2) times a day for 7 days. NAPROXEN (NAPROSYN) 500 MG TABLET    Take 1 Tab by mouth two (2) times daily (with meals) for 10 days. ONDANSETRON (ZOFRAN ODT) 4 MG DISINTEGRATING TABLET    Take 1-2 Tabs by mouth every eight (8) hours as needed for Nausea or Vomiting. PROMETHAZINE (PHENERGAN) 25 MG SUPPOSITORY    Insert 1 Suppository into rectum every six (6) hours as needed for Nausea for up to 7 days. Continue Taking    HYDROCHLOROTHIAZIDE (HYDRODIURIL) 25 MG TABLET    Take 1 Tab by mouth daily. LISINOPRIL (PRINIVIL, ZESTRIL) 10 MG TABLET    Take 1 Tab by mouth daily.  Indications: high blood pressure    OMEPRAZOLE (PRILOSEC) 40 MG CAPSULE    Take 1 Cap by mouth daily. SERTRALINE (ZOLOFT) 100 MG TABLET    Take 2 Tabs by mouth daily. TRIAMCINOLONE ACETONIDE (KENALOG) 0.1 % TOPICAL CREAM    Apply  to affected area two (2) times a day. use thin layer   These Medications have changed    No medications on file   Stop Taking    ONDANSETRON (ZOFRAN ODT) 4 MG DISINTEGRATING TABLET    Take 1-2 Tabs by mouth every eight (8) hours as needed for Nausea or Vomiting.

## 2021-02-02 NOTE — LETTER
NOTIFICATION RETURN TO WORK / SCHOOL 
 
2/2/2021 4:39 AM 
 
Mr. Grace Garcia [address] [phone] To Whom It May Concern: 
 
Grace Garcia is currently under the care of [facility] EMERGENCY DEPT. He will return to work/school on: 2/4/21 If there are questions or concerns please have the patient contact our office. Sincerely, Milagro Augustin MD

## 2021-02-02 NOTE — ED TRIAGE NOTES
Pt to ED via EMS with complaints of lower abdominal pain that started 3-4 hours ago, also reports n/v at home.

## 2025-03-07 NOTE — PROGRESS NOTES
Assumed care of pt. Pt resting in bed, watching tv. AxOx4. No c/o pain. NAD. Call light within reach. Will continue to monitor. 2250- pt's own suboxone counted (verified with LANRE Arenas) and placed in pharmacy bag. Bag sent to [facility], nursing supervisor and placed in lock box to be sent to pharmacy in AM.     0892- reassessment complete. No changes noted. 3357- reassessment complete. No changes noted . Bedside shift change report given to Mari Davis RN (oncoming nurse) by Danny Curiel RN (offgoing nurse). Report included the following information SBAR, Kardex, Intake/Output, MAR, Accordion and Recent Results. No